# Patient Record
Sex: MALE | Race: WHITE | NOT HISPANIC OR LATINO | Employment: UNEMPLOYED | URBAN - METROPOLITAN AREA
[De-identification: names, ages, dates, MRNs, and addresses within clinical notes are randomized per-mention and may not be internally consistent; named-entity substitution may affect disease eponyms.]

---

## 2017-05-22 ENCOUNTER — THERAPY VISIT (OUTPATIENT)
Dept: PHYSICAL THERAPY | Facility: CLINIC | Age: 57
End: 2017-05-22
Payer: COMMERCIAL

## 2017-05-22 DIAGNOSIS — M25.519 SHOULDER PAIN: Primary | ICD-10-CM

## 2017-05-22 PROCEDURE — 97110 THERAPEUTIC EXERCISES: CPT | Mod: GP | Performed by: PHYSICAL THERAPIST

## 2017-05-22 PROCEDURE — 97161 PT EVAL LOW COMPLEX 20 MIN: CPT | Mod: GP | Performed by: PHYSICAL THERAPIST

## 2017-05-22 NOTE — PROGRESS NOTES
[facility] Initial Evaluation  Physical Therapy Initial Examination/Evaluation  May 22, 2017    Bubba MAYER Sarina is a 56 year old  male referred to physical therapy by Dr. Branch for treatment of right shoulder pain with Precautions/Restrictions/MD instructions eval and treat    Subjective:  Referring MD visit date: 4/17/17  DOI/onset: May 2016  Mechanism of injury: Insidious, however, believes that previous job may have contributed to his pain  DOS N/A  Previous treatment: None  Imaging: None  Chief Complaint:   Right shoulder pain that increases with various activities including reaching overhead/behind back, lifting, and carrying   Pain: rest 1 /10, activity 6/10 with reaching/lifting Described as: ache sometimes sharp Alleviated by: rest, inactivity Frequency: intermittent Progression of symptoms since initial onset: same Time of day when pain is worse: day  Sleeping: Will wake on occasion when sleeping on right shoulder    Occupation: None   Current HEP/exercise regimen: Progressive rotator cuff strengthening, periscapular strengthening  Patient's goals are Decrease right shoulder pain, return to prior level of function without pain    Pertinent PMH: History of fracture, overweight, smoking, concussions  General Health Reported by Patient: good  Return to MD:  6 weeks if no improvement      SHOULDER EVALUATION  Static Posture:  Forward head: Mild Rounded shoulders: Mild Scapular winging: None    Range of Motion:    AROM Flexion Abduction Flex/ER Ext/IR   Left 180 180 T2 T6   Right 180 180 T2 T9     Strength:  MMT Flex Scaption Abd ER @ 0 IR @ 0 Mid trap Lower trap   Left 5 5 5 5 5     Right 4 4 4 4 4+       Special Tests  Left Right   Empty Can - +   Drop arm  - -   Yergason's  - -   Speed's - +   Hester-Bj - +   Neer - -   Lift-off - -   Apprehension - -   Graves's - +   Sulcus Sign - -   AC cross body - -                                              Palpation:  Left:  unremarkable  Right: unremarkable           HPI                    Objective:    System    Physical Exam    General     ROS    Assessment/Plan:      Patient is a 56 year old male with right side shoulder complaints.    Patient has the following significant findings with corresponding treatment plan.                Diagnosis 1:  Right shoulder pain  Pain -  manual therapy, splint/taping/bracing/orthotics, self management, education and home program  Decreased ROM/flexibility - manual therapy, therapeutic exercise, therapeutic activity and home program  Decreased strength - therapeutic exercise, therapeutic activities and home program  Decreased proprioception - neuro re-education, therapeutic activities and home program    Therapy Evaluation Codes:   1) History comprised of:   Personal factors that impact the plan of care:      None.    Comorbidity factors that impact the plan of care are:      Concussion and Smoking.     Medications impacting care: Pain.  2) Examination of Body Systems comprised of:   Body structures and functions that impact the plan of care:      Shoulder.   Activity limitations that impact the plan of care are:      Lifting and reaching.  3) Clinical presentation characteristics are:   Stable/Uncomplicated.  4) Decision-Making    Low complexity using standardized patient assessment instrument and/or measureable assessment of functional outcome.  Cumulative Therapy Evaluation is: Low complexity.    Previous and current functional limitations:  (See Goal Flow Sheet for this information)    Short term and Long term goals: (See Goal Flow Sheet for this information)     Communication ability:  Patient appears to be able to clearly communicate and understand verbal and written communication and follow directions correctly.  Treatment Explanation - The following has been discussed with the patient:   RX ordered/plan of care  Anticipated outcomes  Possible risks and side effects  This patient would  benefit from PT intervention to resume normal activities.   Rehab potential is good.    Frequency:  1 X week, once daily  Duration:  for 6 weeks  Discharge Plan:  Achieve all LTG.  Independent in home treatment program.  Reach maximal therapeutic benefit.    Please refer to the daily flowsheet for treatment today, total treatment time and time spent performing 1:1 timed codes.

## 2017-05-22 NOTE — LETTER
[facility]  [address] [facility] [address]  [phone]    May 23, 2017    Re: Bubba Branch   :   1960  MRN:  [id number]   REFERRING PHYSICIAN:   Tawnya Carmona     [facility]    Date of Initial Evaluation:  2017  Visits:  Rxs Used: 1  Reason for Referral:  Shoulder pain    [facility] Initial Evaluation  Physical Therapy Initial Examination/Evaluation  May 22, 2017    Bubba Branch is a 56 year old  male referred to physical therapy by Dr. Branch for treatment of right shoulder pain with Precautions/Restrictions/MD instructions eval and treat    Subjective:  Referring MD visit date: 17  DOI/onset: May 2016  Mechanism of injury: Insidious, however, believes that previous job may have contributed to his pain  DOS N/A  Previous treatment: None  Imaging: None  Chief Complaint:   Right shoulder pain that increases with various activities including reaching overhead/behind back, lifting, and carrying   Pain: rest 1 /10, activity 6/10 with reaching/lifting Described as: ache sometimes sharp Alleviated by: rest, inactivity Frequency: intermittent Progression of symptoms since initial onset: same Time of day when pain is worse: day  Sleeping: Will wake on occasion when sleeping on right shoulder    Occupation: None   Current HEP/exercise regimen: Progressive rotator cuff strengthening, periscapular strengthening  Patient's goals are Decrease right shoulder pain, return to prior level of function without pain    Pertinent PMH: History of fracture, overweight, smoking, concussions  General Health Reported by Patient: good  Return to MD:  6 weeks if no improvement    Pertinent medical history includes:  History of fractures, overweight, smoking and other.  Medical allergies: no.  Other surgeries include:  Other.  Current medications:  None as reported by patient.  SHOULDER EVALUATION  Static  Posture:  Forward head: Mild Rounded shoulders: Mild Scapular winging: None  Range of Motion:    AROM Flexion Abduction Flex/ER Ext/IR   Left 180 180 T2 T6   Right 180 180 T2 T9     Strength:  MMT Flex Scaption Abd ER @ 0 IR @ 0 Mid trap Lower trap   Left 5 5 5 5 5     Right 4 4 4 4 4+       Special Tests  Left Right   Empty Can - +   Drop arm  - -   Yergason's  - -   Speed's - +   Hester-Bj - +   Neer - -   Lift-off - -   Apprehension - -   Graves's - +   Sulcus Sign - -   AC cross body - -                                            Palpation:  Left: unremarkable  Right: unremarkable    Assessment/Plan:      Patient is a 56 year old male with right side shoulder complaints.    Patient has the following significant findings with corresponding treatment plan.                Diagnosis 1:  Right shoulder pain  Pain -  manual therapy, splint/taping/bracing/orthotics, self management, education and home program  Decreased ROM/flexibility - manual therapy, therapeutic exercise, therapeutic activity and home program  Decreased strength - therapeutic exercise, therapeutic activities and home program  Decreased proprioception - neuro re-education, therapeutic activities and home program    Therapy Evaluation Codes:   1) History comprised of:   Personal factors that impact the plan of care:      None.    Comorbidity factors that impact the plan of care are:      Concussion and Smoking.     Medications impacting care: Pain.  2) Examination of Body Systems comprised of:   Body structures and functions that impact the plan of care:      Shoulder.   Activity limitations that impact the plan of care are:      Lifting and reaching.  3) Clinical presentation characteristics are:   Stable/Uncomplicated.  4) Decision-Making    Low complexity using standardized patient assessment instrument and/or measureable assessment of functional outcome.  Cumulative Therapy Evaluation is: Low complexity.  Previous and current functional  limitations:  (See Goal Flow Sheet for this information)    Short term and Long term goals: (See Goal Flow Sheet for this information)   Communication ability:  Patient appears to be able to clearly communicate and understand verbal and written communication and follow directions correctly.  Treatment Explanation - The following has been discussed with the patient:   RX ordered/plan of care  Anticipated outcomes  Possible risks and side effects  This patient would benefit from PT intervention to resume normal activities.   Rehab potential is good.  Frequency:  1 X week, once daily  Duration:  for 6 weeks  Discharge Plan:  Achieve all LTG.  Independent in home treatment program.  Reach maximal therapeutic benefit.    Thank you for your referral.    INQUIRIES  Therapist: Kedar Seymour, PT   [facility] [address] [address]  Phone: [phone]  Fax: [phone]

## 2017-05-22 NOTE — MR AVS SNAPSHOT
"              After Visit Summary   5/22/2017    Bubba Branch    MRN: [id number]           Patient Information     Date Of Birth          1960        Visit Information        Provider Department      5/22/2017 10:20 AM Kedar Seymour PT [facility]        Today's Diagnoses     Shoulder pain    -  1       Follow-ups after your visit        Your next 10 appointments already scheduled     Jun 07, 2017 11:00 AM CDT   TONYA Extremity with Kedar Seymour PT   [facility] ([facility])    [address] [place] [id number] [phone]            Jun 13, 2017 10:20 AM CDT   TONYA Extremity with Kedar Seymour PT   [facility] ([facility])    [address] [place] [id number] [phone]              Who to contact     If you have questions or need follow up information about today's clinic visit or your schedule please contact [facility] directly at [phone].  Normal or non-critical lab and imaging results will be communicated to you by [employer], letter or phone within 4 business days after the clinic has received the results. If you do not hear from us within 7 days, please contact the clinic through Imp[employer] or phone. If you have a critical or abnormal lab result, we will notify you by phone as soon as possible.  Submit refill requests through Toplist or call your pharmacy and they will forward the refill request to us. Please allow 3 business days for your refill to be completed.          Additional Information About Your Visit        [employer] Information     Toplist lets you send messages to your doctor, view your test results, renew your prescriptions, schedule appointments and more. To sign up, go to [email or web address] . Click on \"Log in\" on the left side of the screen, which will take you to the Welcome " "page. Then click on \"Sign up Now\" on the right side of the page.     You will be asked to enter the access code listed below, as well as some personal information. Please follow the directions to create your username and password.     Your access code is: [email or web address]  Expires: 2017 12:22 PM     Your access code will  in 90 days. If you need help or a new code, please call your [facility] [place] clinic or [phone].        Care EveryWhere ID     This is your Care EveryWhere ID. This could be used by other organizations to access your [place] medical records  [id number]         Blood Pressure from Last 3 Encounters:   10/12/16 127/77   16 93/54   12 112/78    Weight from Last 3 Encounters:   10/12/16 81.6 kg (180 lb)   16 73.5 kg (162 lb)   12 78.7 kg (173 lb 8 oz)              We Performed the Following     HC PT EVAL, LOW COMPLEXITY     TONYA INITIAL EVAL REPORT     THERAPEUTIC EXERCISES        Primary Care Provider    Km Delgado       [address] [facility] [address]        Thank you!     Thank you for [facility] [place]  for your care. Our goal is always to provide you with excellent care. Hearing back from our patients is one way we can continue to improve our services. Please take a few minutes to complete the written survey that you may receive in the mail after your visit with us. Thank you!             Your Updated Medication List - Protect others around you: Learn how to safely use, store and throw away your medicines at www.disposemymeds.org.          This list is accurate as of: 17 12:22 PM.  Always use your most recent med list.                   Brand Name Dispense Instructions for use    * acyclovir 200 MG capsule    ZOVIRAX    25 capsule    Take 1 capsule by mouth 5 times daily.       * acyclovir 400 MG tablet    ZOVIRAX    30 tablet    Take 1 tablet by mouth daily.       CALCIUM + D PO      Take  by mouth.       minocycline " 100 MG capsule    MINOCIN/DYNACIN    180 capsule    Take 1 capsule by mouth 2 times daily.       MULTIVITAMINS PO      Take 1 tablet by mouth daily.       omeprazole 40 MG capsule    priLOSEC    90 capsule    Take 1 capsule (40 mg) by mouth daily Take 30-60 minutes before a meal.       VITAMIN C PO      Take 1 tablet by mouth daily.       VITAMIN D PO      Take 1 tablet by mouth daily.       Vitamin-B Complex Tabs      Take 1 tablet by mouth daily.       * Notice:  This list has 2 medication(s) that are the same as other medications prescribed for you. Read the directions carefully, and ask your doctor or other care provider to review them with you.

## 2017-05-23 NOTE — PROGRESS NOTES
Subjective:    Patient is a 56 year old male presenting with rehab left ankle/foot hpi.                                      Pertinent medical history includes:  History of fractures, overweight, smoking and other.  Medical allergies: no.  Other surgeries include:  Other.  Current medications:  None as reported by patient.                                      Objective:    System    Physical Exam    General     ROS    Assessment/Plan:

## 2017-10-13 DIAGNOSIS — K21.00 GASTROESOPHAGEAL REFLUX DISEASE WITH ESOPHAGITIS: ICD-10-CM

## 2017-10-13 RX ORDER — OMEPRAZOLE 40 MG/1
CAPSULE, DELAYED RELEASE ORAL
Qty: 90 CAPSULE | Refills: 2 | Status: SHIPPED | OUTPATIENT
Start: 2017-10-13 | End: 2022-02-15

## 2017-12-20 ENCOUNTER — TRANSFERRED RECORDS (OUTPATIENT)
Dept: HEALTH INFORMATION MANAGEMENT | Facility: CLINIC | Age: 57
End: 2017-12-20

## 2017-12-20 ENCOUNTER — MEDICAL CORRESPONDENCE (OUTPATIENT)
Dept: HEALTH INFORMATION MANAGEMENT | Facility: CLINIC | Age: 57
End: 2017-12-20

## 2018-03-15 ENCOUNTER — OFFICE VISIT (OUTPATIENT)
Dept: DERMATOLOGY | Facility: CLINIC | Age: 58
End: 2018-03-15
Payer: COMMERCIAL

## 2018-03-15 DIAGNOSIS — L73.8 SENILE SEBACEOUS GLAND HYPERPLASIA: ICD-10-CM

## 2018-03-15 DIAGNOSIS — L70.0 ACNE VULGARIS: Primary | ICD-10-CM

## 2018-03-15 DIAGNOSIS — L90.5 ATROPHIC SCAR: ICD-10-CM

## 2018-03-15 RX ORDER — TRETINOIN 0.25 MG/G
CREAM TOPICAL
Qty: 45 G | Refills: 2 | Status: SHIPPED | OUTPATIENT
Start: 2018-03-15 | End: 2021-03-09

## 2018-03-15 ASSESSMENT — PAIN SCALES - GENERAL: PAINLEVEL: NO PAIN (0)

## 2018-03-15 NOTE — MR AVS SNAPSHOT
After Visit Summary   3/15/2018    Bubba Branch    MRN: [id number]           Patient Information     Date Of Birth          1960        Visit Information        Provider Department      3/15/2018 9:45 AM Km Barron MD [facility]        Today's Diagnoses     Acne vulgaris    -  1    Atrophic scar        Senile sebaceous gland hyperplasia           Follow-ups after your visit        Follow-up notes from your care team     Return in about 4 months (around 7/15/2018).      Who to contact     Please call your clinic at [phone] to:    Ask questions about your health    Make or cancel appointments    Discuss your medicines    Learn about your test results    Speak to your doctor            Additional Information About Your Visit        MyChart Information     [employer] is an electronic gateway that provides easy, online access to your medical records. With [employer], you can request a clinic appointment, read your test results, renew a prescription or communicate with your care team.     To sign up for [employer] visit the website at [email or web address]   You will be asked to enter the access code listed below, as well as some personal information. Please follow the directions to create your username and password.     Your access code is: [email or web address]  Expires: 2018  7:30 AM     Your access code will  in 90 days. If you need help or a new code, please contact your [facility] or call [phone] for assistance.        Care EveryWhere ID     This is your Care EveryWhere ID. This could be used by other organizations to access your [facility] medical records  [id number]         Blood Pressure from Last 3 Encounters:   No data found for BP    Weight from Last 3 Encounters:   No data found for Wt              Today, you had the following     No orders found for display         Today's Medication Changes          These changes are  accurate as of 3/15/18 11:59 PM.  If you have any questions, ask your nurse or doctor.               Start taking these medicines.        Dose/Directions    tretinoin 0.025 % cream   Commonly known as:  RETIN-A   Used for:  Acne vulgaris   Started by:  Km Braron MD        Apply a pea-sized amount evenly over the face at nighttime before bed.   Quantity:  45 g   Refills:  2            Where to get your medicines      These medications were sent to [facility] [address] [place] [address] AT [address] [address]     Phone:  [phone]     tretinoin 0.025 % cream                Primary Care Provider    Km Delgado       [address] [facility] [address]        Equal Access to Services     GEOVANNA BISHOP : Hadii onofre alex hadasho Soomaali, waaxda luqadaha, qaybta kaalmada adeegyada, waxay jose armando baron . So [facility] [phone].    ATENCIÓN: Si habla español, tiene a garcia disposición servicios gratuitos de asistencia lingüística. [facility] [phone].    We comply with applicable federal civil rights laws and [place] laws. We do not discriminate on the basis of race, color, national origin, age, disability, sex, sexual orientation, or gender identity.            Thank you!     Thank you for choosing [facility]  for your care. Our goal is always to provide you with excellent care. Hearing back from our patients is one way we can continue to improve our services. Please take a few minutes to complete the written survey that you may receive in the mail after your visit with us. Thank you!             Your Updated Medication List - Protect others around you: Learn how to safely use, store and throw away your medicines at www.disposemymeds.org.          This list is accurate as of 3/15/18 11:59 PM.  Always use your most recent med list.                   Brand Name Dispense Instructions for use Diagnosis    * acyclovir  200 MG capsule    ZOVIRAX    25 capsule    Take 1 capsule by mouth 5 times daily.    Recurrent herpes simplex       * acyclovir 400 MG tablet    ZOVIRAX    30 tablet    Take 1 tablet by mouth daily.    Herpes simplex       CALCIUM + D PO      Take  by mouth.        minocycline 100 MG capsule    MINOCIN/DYNACIN    180 capsule    Take 1 capsule by mouth 2 times daily.    Acne       MULTIVITAMINS PO      Take 1 tablet by mouth daily.        omeprazole 40 MG capsule    priLOSEC    90 capsule    TAKE 1 CAPSULE (40 MG) BY MOUTH DAILY TAKE 30-60 MINUTES BEFORE A MEAL.    Gastroesophageal reflux disease with esophagitis       tretinoin 0.025 % cream    RETIN-A    45 g    Apply a pea-sized amount evenly over the face at nighttime before bed.    Acne vulgaris       VITAMIN C PO      Take 1 tablet by mouth daily.        VITAMIN D PO      Take 1 tablet by mouth daily.        Vitamin-B Complex Tabs      Take 1 tablet by mouth daily.        * Notice:  This list has 2 medication(s) that are the same as other medications prescribed for you. Read the directions carefully, and ask your doctor or other care provider to review them with you.
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
## 2018-03-15 NOTE — NURSING NOTE
Chief Complaint   Patient presents with     Derm Problem     Mr. Adrian is here today for a consult for acne on the face, chest and shoulders. He has had acne since he was a teenager. He was on Tetracycline, Minocycline, and a few topicals years ago that he cannot remember the names of them. His primary was worried about the long term use of antibiotics and [place] [facility] He is using clindamycin lotion and BPO wash. He would talk about accutane.      Karely Gallo, CMA

## 2018-03-15 NOTE — LETTER
3/15/2018       RE: Bubba Branch  [address] [place] [address]     Dear Colleague,    Thank you for referring your patient, Bubba Branch, to the [facility] DERMATOLOGY at [facility]. Please see a copy of my visit note below.    [facility] Dermatology Note      Dermatology Problem List:  1. Acne vulgaris with sebaceous hyperplasia.   - tretinoin 0.025% cream QHS started 3/15/18  - BP 5% wash, clindamycin 1% lotion  2. Atrophic scar, R temporal scalp. Referral to [facility].     Encounter Date: Mar 15, 2018    CC:   Chief Complaint   Patient presents with     Derm Problem     Mr. Adrian is here today for a consult for acne on the face, chest and shoulders. He has had acne since he was a teenager. He was on Tetracycline, Minocycline, and a few topicals years ago that he cannot remember the names of them. His primary was worried about the long term use of antibiotics and he was seen at [facility]. He is using clindamycin lotion and BPO wash. He would talk about accutane.      History of Present Illness:  Mr. Bubba Branch is a 57 year old male who presents in self referral for acne vulgaris. He recently was evaluated at [facility] dermatology for a longstanding history of acne vulgaris on the face, chest, and back. Had prior been treated with long term oral minocycline which was discontinued at that visit. He was started on BP 5% wash with clindamycin 1% lotion. They had briefly discussed starting oral istretinoin if this was not effective. He states he has noticed some mild flaring of his acne particularly on his face, with deeper inflamed papules, since stopping minocycline. He gets occasionally spots on his back and chest though less bothersome to him. Health otherwise stable.    Of note, patient also reports an atrophic scar on his right scalp after a head injury. He is interested in cosmetic treatments for this. No other skin concerns.     Past  Medical History:   Patient Active Problem List   Diagnosis     Chemical dependency (H)     CARDIOVASCULAR SCREENING; LDL GOAL LESS THAN 130     Pes planus     Mild major depression (H)     Shoulder pain     Past Medical History:   Diagnosis Date     Back injury 2008     Chemical dependency (H)      Depression      Head injury 2003, 2008     Past Surgical History:   Procedure Laterality Date     ENT SURGERY       ESOPHAGOSCOPY, GASTROSCOPY, DUODENOSCOPY (EGD), COMBINED N/A 6/29/2016    Procedure: COMBINED ESOPHAGOSCOPY, GASTROSCOPY, DUODENOSCOPY (EGD), BIOPSY SINGLE OR MULTIPLE;  Surgeon: Kim Cisneros MD;  Location:  GI     Social History:  The patient does not work. The patient denies use of tanning beds.    Family History:  No relevant family medical history.     Medications:  Current Outpatient Prescriptions   Medication Sig Dispense Refill     omeprazole (PRILOSEC) 40 MG capsule TAKE 1 CAPSULE (40 MG) BY MOUTH DAILY TAKE 30-60 MINUTES BEFORE A MEAL. 90 capsule 2     acyclovir (ZOVIRAX) 400 MG tablet Take 1 tablet by mouth daily. 30 tablet 0     acyclovir (ZOVIRAX) 200 MG capsule Take 1 capsule by mouth 5 times daily. 25 capsule 5     Multiple Vitamin (MULTIVITAMINS PO) Take 1 tablet by mouth daily.       minocycline (MINOCIN,DYNACIN) 100 MG capsule Take 1 capsule by mouth 2 times daily. (Patient not taking: Reported on 3/15/2018) 180 capsule 3     Calcium Carbonate-Vitamin D (CALCIUM + D PO) Take  by mouth.       B Complex Vitamins (VITAMIN-B COMPLEX) TABS Take 1 tablet by mouth daily.       Ascorbic Acid (VITAMIN C PO) Take 1 tablet by mouth daily.       Cholecalciferol (VITAMIN D PO) Take 1 tablet by mouth daily.          Allergies   Allergen Reactions     Penicillins      Review of Systems:  -Skin/Heme New Pt: The patient denies frequent sun exposure. The patient denies excessive scarring or problems healing except as per HPI. The patient denies excessive bleeding.  -Constitutional: The patient denies  fatigue, fevers, chills, unintended weight loss, and night sweats.  -HEENT: Patient denies nonhealing oral sores.  -Skin: As above in HPI. No additional skin concerns.    Physical exam:  Vitals: There were no vitals taken for this visit.  GEN: This is a well developed, well-nourished male in no acute distress, in a pleasant mood.    SKIN: Acne exam, which includes the face, neck, upper central chest, and upper central back was performed.  - Few pink inflammatory papules on bilateral cheeks, chin, and nose.   - Waxy, yellow-colored papules c/w sebaceous hyperplasia on bilateral face  - Few pink follicular based papules on chest and back.   - Linear white scar on the right temporal scalp with atrophy of lateral edge.   -No other lesions of concern on areas examined.     Impression/Plan:  1. Acne vulgaris, mild inflammatory acne with associated sebaceous hyperplasia. Recommended starting topical retinoid and advised against use of systemic retinoids at this point given risks of treatment likely do not outweigh benefit given his mild disease. Otherwise, would continue prior regimen.     Start tretinoin 0.025% cream QHS; side effects discussed including dry skin, irritant dermatitis. Advised to start every other night and increase to nightly as tolerated. Can increase to 0.05% at follow-up as needed.     Continue clindamycin 1% lotion qAM    Continue BP 5% wash daily in the shower      2. Atrophic scar, R temporal scalp. Referral to [facility] to discuss possible laser treatment options. Also discussed that tretinoin 0.025% cream as above can lead to increased collagen production and may improve appearance of his scar. He can apply to that area nightly as well with application to his face.   Follow-up in 3-4 months, earlier for new or changing lesions.   Dr. Geiger staffed the patient.    Staff Involved:  Resident(Km Barron)/Staff(as above)    Again, thank you for allowing me to participate in the care of your  patient.    Sincerely,    Km Barron MD

## 2018-03-16 ASSESSMENT — PATIENT HEALTH QUESTIONNAIRE - PHQ9: SUM OF ALL RESPONSES TO PHQ QUESTIONS 1-9: 6

## 2018-03-19 ENCOUNTER — TELEPHONE (OUTPATIENT)
Dept: DERMATOLOGY | Facility: CLINIC | Age: 58
End: 2018-03-19

## 2018-03-19 NOTE — TELEPHONE ENCOUNTER
PRIOR AUTHORIZATION DENIED    Medication: Tretinoin - denied    Denial Date: 3/19/2018    Denial Rational: script is denied because we are out of network for [facility]      Appeal Information: no appeal allowed for out of network providers

## 2018-03-19 NOTE — TELEPHONE ENCOUNTER
Central Prior Authorization Team   Phone: [phone]    PA Initiation    Medication: Tretinoin  Insurance Company: [employer] - Phone [phone] Fax [phone]  Pharmacy Filling the Rx: [facility] [id number] [place] [address] AT [facility] [address]  Filling Pharmacy Phone: [phone]  Filling Pharmacy Fax: [phone]  Start Date: 3/19/2018

## 2018-04-11 NOTE — TELEPHONE ENCOUNTER
Bubba Simmons has no Rx benefits here at the [facility] or at the main hospital. All his Rx needs to filled at [facility] or an affiliate. His consult with Dr. Geiger & Sarah we'll try to get an exception on it via auth if the appointment ends up getting denied.

## 2019-06-11 ENCOUNTER — DOCUMENTATION ONLY (OUTPATIENT)
Dept: CARE COORDINATION | Facility: CLINIC | Age: 59
End: 2019-06-11

## 2019-07-03 ENCOUNTER — OFFICE VISIT (OUTPATIENT)
Dept: DERMATOLOGY | Facility: CLINIC | Age: 59
End: 2019-07-03
Payer: COMMERCIAL

## 2019-07-03 DIAGNOSIS — L90.5 SCAR: Primary | ICD-10-CM

## 2019-07-03 ASSESSMENT — PAIN SCALES - GENERAL: PAINLEVEL: NO PAIN (0)

## 2019-07-03 NOTE — NURSING NOTE
Dermatology Rooming Note    Bubba Branch's goals for this visit include:   Chief Complaint   Patient presents with     Derm Problem     Bubba would like to discuss treatment options for scarring and an indentation on his scalp.      Namrata Iraheta LPN

## 2019-07-03 NOTE — PATIENT INSTRUCTIONS
1. Scar and indentation   - Recommend using sunscreen daily on scalp and wearing a hat in order to prevent tanning   - Would use a CO2 (CORE) laser in order to help form new collagen and fill in the scars and other areas of concern   - Will get quote today for cost and, if interested, would have appt set up in 6 weeks    CO2RE Laser    I will experience redness, swelling, peeling, pain, and heat sensation. I may experience bleeding, oozing, itching, or acne. Risks are blistering, permanent scarring, temporary or permanent skin lightening or darkening, infection, and eye injury. I understand my outcome could be no improvement, slight improvement. Multiple treatments may be required.    TWO WEEKS BEFORE TREATMENT    Stop use of all Retinols   o Retin A, Tazorac,  anti-aging  products    Stop use of all glycolic acid treatments (longer if deeper peel)    Stop use of all salicylic acid products    Stop excessive sun exposure 8 weeks prior to treatment    Stop waxing    Stop abrasive scrubs    Stop microdermabrasion treatments    Stop hydroquinone(bleaching cream) 3 day sprior    Men should not shave 24 hours prior.      and bring  o Aquaphor   o Cetaphil cream  o Cetaphil gentle facial cleanser    THE DAY OF TREATMENT    Come to the appointment with no make-up, lotions or other products on the face    Take to your doctor about pain control after the procedure    If taking pain medication, please, bring a  or we will be unable to do the procedure      AFTER CARE INSTRUCTIONS    - Avoid the sun    - You may start wound care after 24 hours or sooner if comfortable.    - The first 3 days, start soaks, twice daily, soak for a few minutes with a clean cloth saturated with a dilute vinegar solution to help prevent infection.    Mix 2 tablespoons of household white vinegar in 2 cups of water.    Prepare fresh each day     Apply  Aquaphor to protect and soften the peeling skin.   - After 3 days, cleanse the face  water with water and a mild/gentle cleanser (i.e. Vanicream facial cleanser or Cetaphil facial cleanserproducts) once daily. Then, apply a thin layer of Cetaphil cream several times a day for healing and comfort.Once the skin sloughing and flaking is over, ointment (Aquaphor and epidermal repair cream) is no longer needed  - One sloughing and flaking is over sunblock  must be applied. Resume routine skin care and use of cosmetics as tolerated   - In some cases use of a bleaching cream may be recommended to start as well.  - Men may begin using an electric razor if they prefer to shave after 1 week.   - Apply sunblock every day and reapply every 2 hours when outside. Sun exposure can cause dark brown discoloration.    Reapply several times per day every day regardless of weather or indoors.    Use sunblock for face, SPF at least 30, broad spectrum (UVA & UVB)    Use diligently at least 3 months and avoid direct sun exposure.    Use a broad-rim hat if outdoors for a long time.      Who should I call with questions?       [facility]: [phone]       [facility]: [phone]       For urgent needs outside of business hours call the [facility] at [phone]        and ask for the dermatology resident on call
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
## 2019-07-31 ENCOUNTER — OFFICE VISIT (OUTPATIENT)
Dept: DERMATOLOGY | Facility: CLINIC | Age: 59
End: 2019-07-31
Payer: COMMERCIAL

## 2019-07-31 DIAGNOSIS — L90.5 SCAR: Primary | ICD-10-CM

## 2019-07-31 ASSESSMENT — PAIN SCALES - GENERAL: PAINLEVEL: NO PAIN (0)

## 2019-07-31 NOTE — NURSING NOTE
Chief Complaint   Patient presents with     Laser Treatment     Bubba is here today for CO2 laser on the right temporal scalp.      Merlene Bruce LPN    Dermatology Laser Intake Checklist:  History of psoriasis:No  History of recent tan, indoor or outdoor tanning/vacation or other sun exposure: No  History of vitiligo:No  Family history of vitiligo:No  Recent other cosmetic procedure(microderm abrasion/peel/hair removal/facial etc):No  History of HSV:NA   Did the patient start valtrex: nA  For genital laser hair removal patient only: Is there a history of genital warts or condyloma:NA  Tattoo in the area to be treated:No  Is patient using hydroquinone:No  Retinoids and other acne medications stopped for 2 weeks:No  Has the patient had accutane in the last 6-12 months:No  Pregnant or breastfeeding: NA  History of skin cancer in area planned for treatment: No  History of treatment with gold:No  Changes in medical history: No  Photos obtained: Yes  Does the patient smoke:No  Is the patient on ibuprofen/aspirin/plavix/coumadin/other blood thinner: No  If patient is taking narcotic or diazepam(valium)-does patient have : No  There were no vitals taken for this visit.

## 2019-07-31 NOTE — PATIENT INSTRUCTIONS
CO2RE Laser    I will experience redness, swelling, peeling, pain, and heat sensation. I may experience bleeding, oozing, itching, or acne. Risks are blistering, permanent scarring, temporary or permanent skin lightening or darkening, infection, and eye injury. I understand my outcome could be no improvement, slight improvement. Multiple treatments may be required.    TWO WEEKS BEFORE TREATMENT    Stop use of all Retinols   o Retin A, Tazorac,  anti-aging  products    Stop use of all glycolic acid treatments (longer if deeper peel)    Stop use of all salicylic acid products    Stop excessive sun exposure 8 weeks prior to treatment    Stop waxing    Stop abrasive scrubs    Stop microdermabrasion treatments    Stop hydroquinone(bleaching cream) 3 day sprior    Men should not shave 24 hours prior.      and bring  o Aquaphor   o Cetaphil cream  o Cetaphil gentle facial cleanser    THE DAY OF TREATMENT    Come to the appointment with no make-up, lotions or other products on the face    Take to your doctor about pain control after the procedure    If taking pain medication, please, bring a  or we will be unable to do the procedure      AFTER CARE INSTRUCTIONS    - Avoid the sun    - You may start wound care after 24 hours or sooner if comfortable.    - The first 3 days, start soaks, twice daily, soak for a few minutes with a clean cloth saturated with a dilute vinegar solution to help prevent infection.    Mix 2 tablespoons of household white vinegar in 2 cups of water.    Prepare fresh each day     Apply  Aquaphor to protect and soften the peeling skin.   - After 3 days, cleanse the face water with water and a mild/gentle cleanser (i.e. Vanicream facial cleanser or Cetaphil facial cleanserproducts) once daily. Then, apply a thin layer of Cetaphil cream several times a day for healing and comfort.Once the skin sloughing and flaking is over, ointment (Aquaphor and epidermal repair cream) is no longer  needed  - One sloughing and flaking is over sunblock  must be applied. Resume routine skin care and use of cosmetics as tolerated   - In some cases use of a bleaching cream may be recommended to start as well.  - Men may begin using an electric razor if they prefer to shave after 1 week.   - Apply sunblock every day and reapply every 2 hours when outside. Sun exposure can cause dark brown discoloration.    Reapply several times per day every day regardless of weather or indoors.    Use sunblock for face, SPF at least 30, broad spectrum (UVA & UVB)    Use diligently at least 3 months and avoid direct sun exposure.    Use a broad-rim hat if outdoors for a long time.      Who should I call with questions?       [facility]: [phone]       [facility]: [phone]       For urgent needs outside of business hours call the [facility] at [phone]        and ask for the dermatology resident on call          CO2RE Laser    I will experience redness, swelling, peeling, pain, and heat sensation. I may experience bleeding, oozing, itching, or acne. Risks are blistering, permanent scarring, temporary or permanent skin lightening or darkening, infection, and eye injury. I understand my outcome could be no improvement, slight improvement. Multiple treatments may be required.    TWO WEEKS BEFORE TREATMENT    Stop use of all Retinols   o Retin A, Tazorac,  anti-aging  products    Stop use of all glycolic acid treatments (longer if deeper peel)    Stop use of all salicylic acid products    Stop excessive sun exposure 8 weeks prior to treatment    Stop waxing    Stop abrasive scrubs    Stop microdermabrasion treatments    Stop hydroquinone(bleaching cream) 3 day sprior    Men should not shave 24 hours prior.      and bring  o Aquaphor   o Cetaphil cream  o Cetaphil gentle facial cleanser    THE DAY OF TREATMENT    Come to the appointment with no  make-up, lotions or other products on the face    Valtrex 500mg twice daily, start 2 days prior and continue taking for 1-2 weeks as per your dcotor    Bring your diazepam and percocet pills to your laser procedure    Start Keflex three times daily start after laser procedure    Take to your doctor about pain control after the procedure    If taking pain medication, please, bring a  or we will be unable to do the procedure      AFTER CARE INSTRUCTIONS  - Continue your Valtrex    - Start your Cephalexin(Keflex) right after your laser procedure    - Avoid the sun    - You may start wound care after 24 hours or sooner if comfortable.    - The first 3 days, start soaks, twice daily, soak for a few minutes with a clean cloth saturated with a dilute vinegar solution to help prevent infection.    Mix 2 tablespoons of household white vinegar in 2 cups of water.    Prepare fresh each day     Apply  Aquaphor to protect and soften the peeling skin.   - After 3 days, cleanse the face water with water and a mild/gentle cleanser (i.e. Vanicream facial cleanser or Cetaphil facial cleanserproducts) once daily. Then, apply a thin layer of Cetaphil cream several times a day for healing and comfort.Once the skin sloughing and flaking is over, ointment (Aquaphor and epidermal repair cream) is no longer needed  - One sloughing and flaking is over sunblock  must be applied. Resume routine skin care and use of cosmetics as tolerated   - In some cases use of a bleaching cream may be recommended to start as well.  - Men may begin using an electric razor if they prefer to shave after 1 week.   - Apply sunblock every day and reapply every 2 hours when outside. Sun exposure can cause dark brown discoloration.    Reapply several times per day every day regardless of weather or indoors.    Use sunblock for face, SPF at least 30, broad spectrum (UVA & UVB)    Use diligently at least 3 months and avoid direct sun exposure.    Use a broad-rim  hat if outdoors for a long time.      Who should I call with questions?       [facility]: [phone]       [facility]: [phone]       For urgent needs outside of business hours call the [facility] at [phone]        and ask for the dermatology resident on call

## 2019-07-31 NOTE — LETTER
7/31/2019       RE: Bubba Branch  [address] [facility] [address]     Dear Colleague,    Thank you for referring your patient, Bubba Branch, to the [facility] DERMATOLOGY at [facility]. Please see a copy of my visit note below.    See procedure note.     Again, thank you for allowing me to participate in the care of your patient.      Sincerely,    Reba Hudson MD

## 2019-07-31 NOTE — PROCEDURES
CORE Procedure: Cosmetic    Procedure Date: 07/31/2019  Staff: Dr. Reba Hudson MD  Assistant: Merlene Bruce LPN    Procedure:   CO2RE, fractional CO2 ablative laser treatment # 1    Anesthesia and Premedication:  Anesthesia (topical): Benzocaine 20%/Lidocaine 8%/Tetracaine 4% ointment  [employer]       Device Settings:  Diagnosis: traumatic scars   Location: right frontal scalp x4   Mode(class/deep/mid/etc): deep  Frational coverage(%): 5%  Core(mJ): 70   1 pass     2 passes on depressed scar on central scalp    Description of Procedure:   The nature and purpose of the procedure, associated risks, possible consequences, complications, and alternative methods of treatment were explained in detail including but not limited to redness, swelling, peeling of the skin, eye injury, infection, bleeding, oozing, heat sensation, itching or acne.  Possible outcomes were reviewed including the following: no improvement, slight improvement, skin lightening or darkening. The possibility of permanent scarring was reviewed. Discussion that multiple treatments may be required was completed.     Photo consent was obtained and reviewed, a time out was performed, and informed consent was obtained.  The area was cleansed with [employer]. Protective eyewear was worn by the patient and all personnel in the treatment room  The patient confirmed the site to be treated. The laser energy output was verified by meter reading. The [employer] cooler and smoke evacuator devices were operated coincident with the CO2RE laser. The areas were treated with CO2RE laser as described.  Cold compresses then aquaphor was applied in a thin layer. The patient tolerated the procedure well and no complications were noted. Verbal and written aftercare instructions were provided. Post procedure care including use of mild, gentle cleansers and moisturizers  for the first week following treatment was reviewed. The patient was recommend application of at least SPF 30  sunscreen daily  and avoidance of direct sunlight up to 3 months post procedure. The patient was discharged from the dermatology clinic in good condition.    The patient will follow up with nursing in 48 hours and with MD in 6 weeks.     The patient will pay the cosmetic fee today.     Staff Involved:  Scribe/Staff    Scribe Disclosure  I, Ninoska Bragg, am serving as a scribe to document services personally performed by Dr. Reba Hudson MD, based on data collection and the provider's statements to me.     Provider Disclosure:   The documentation recorded by the scribe accurately reflects the services I personally performed and the decisions made by me.    Reba Hudson MD    Department of Dermatology  [facility]: Phone: [phone], Fax:[phone]  [facility]: Phone: [phone], Fax: [phone]

## 2019-07-31 NOTE — LETTER
Date:August 1, 2019      Patient was self referred, no letter generated. Do not send.        [facility] Health Information

## 2019-08-02 ENCOUNTER — ALLIED HEALTH/NURSE VISIT (OUTPATIENT)
Dept: DERMATOLOGY | Facility: CLINIC | Age: 59
End: 2019-08-02
Payer: COMMERCIAL

## 2019-08-02 DIAGNOSIS — L90.5 SCAR: Primary | ICD-10-CM

## 2019-08-02 NOTE — PROGRESS NOTES
Bubba Branch comes into clinic today at the request of Dr Hudson Ordering Provider for follow up photos. Patient has been caring for site by applying aquaphor. He had stopped doing the vinegar soaks and was instructed to continue through today. Recommended he reapply aquaphor more often as there is a small amount of of scabbing at laser site. No s/s of infection present. He states understanding and will contact clinic with any concerns.    This service provided today was under the supervising provider of the day Dr Mckeon, who was available if needed.    Yary Johnson RN

## 2019-09-04 ENCOUNTER — OFFICE VISIT (OUTPATIENT)
Dept: DERMATOLOGY | Facility: CLINIC | Age: 59
End: 2019-09-04
Payer: COMMERCIAL

## 2019-09-04 DIAGNOSIS — L90.5 SCAR: Primary | ICD-10-CM

## 2019-09-04 ASSESSMENT — PAIN SCALES - GENERAL: PAINLEVEL: NO PAIN (0)

## 2019-09-04 NOTE — LETTER
9/4/2019       RE: Bubba Branch  [address] [place] [facility] [address]     Dear Colleague,    Thank you for referring your patient, Bubba Branch, to the [facility] DERMATOLOGY at [facility]. Please see a copy of my visit note below.    See procedure note.    Again, thank you for allowing me to participate in the care of your patient.      Sincerely,    Reba Hudson MD

## 2019-09-04 NOTE — NURSING NOTE
Drug Administration Record    Prior to injection, verified patient identity using patient's name and date of birth.  Due to injection administration, patient instructed to remain in clinic for 15 minutes  afterwards, and to report any adverse reaction to me immediately.    Drug Name: BLT   Route administered: topical

## 2019-09-04 NOTE — NURSING NOTE
"Chief Complaint   Patient presents with     Laser Treatment     Bubba is here today for CO2 for his scalp. Bubba notes \"great improvement after his last treatment\".      Merlene Bruce LPN      Dermatology Laser Intake Checklist:  History of psoriasis:No  History of recent tan, indoor or outdoor tanning/vacation or other sun exposure: No  History of vitiligo:No  Family history of vitiligo:No  Recent other cosmetic procedure(microderm abrasion/peel/hair removal/facial etc):No  History of HSV:No   Did the patient start valtrex: Yes, acyclovir  For genital laser hair removal patient only: Is there a history of genital warts or condyloma:Na  Tattoo in the area to be treated:No  Is patient using hydroquinone:No  Retinoids and other acne medications stopped for 2 weeks:Yes  Has the patient had accutane in the last 6-12 months:No  Pregnant or breastfeeding: Na'  History of skin cancer in area planned for treatment: No  History of treatment with gold:No  Changes in medical history: No  Photos obtained: Yes  Does the patient smoke:No  Is the patient on ibuprofen/aspirin/plavix/coumadin/other blood thinner: No  If patient is taking narcotic or diazepam(valium)-does patient have : Na  There were no vitals taken for this visit.      "

## 2019-09-04 NOTE — PATIENT INSTRUCTIONS
CO2RE Laser    I will experience redness, swelling, peeling, pain, and heat sensation. I may experience bleeding, oozing, itching, or acne. Risks are blistering, permanent scarring, temporary or permanent skin lightening or darkening, infection, and eye injury. I understand my outcome could be no improvement, slight improvement. Multiple treatments may be required.    TWO WEEKS BEFORE TREATMENT    Stop use of all Retinols   o Retin A, Tazorac,  anti-aging  products    Stop use of all glycolic acid treatments (longer if deeper peel)    Stop use of all salicylic acid products    Stop excessive sun exposure 8 weeks prior to treatment    Stop waxing    Stop abrasive scrubs    Stop microdermabrasion treatments    Stop hydroquinone(bleaching cream) 3 day sprior    Men should not shave 24 hours prior.      and bring  o Aquaphor   o Cetaphil cream  o Cetaphil gentle facial cleanser    THE DAY OF TREATMENT    Come to the appointment with no make-up, lotions or other products on the face    Take to your doctor about pain control after the procedure    If taking pain medication, please, bring a  or we will be unable to do the procedure      AFTER CARE INSTRUCTIONS    - Avoid the sun    - You may start wound care after 24 hours or sooner if comfortable.    - The first 3 days, start soaks, twice daily, soak for a few minutes with a clean cloth saturated with a dilute vinegar solution to help prevent infection.    Mix 2 tablespoons of household white vinegar in 2 cups of water.    Prepare fresh each day     Apply  Aquaphor to protect and soften the peeling skin.   - After 3 days, cleanse the face water with water and a mild/gentle cleanser (i.e. Vanicream facial cleanser or Cetaphil facial cleanserproducts) once daily. Then, apply a thin layer of Cetaphil cream several times a day for healing and comfort.Once the skin sloughing and flaking is over, ointment (Aquaphor and epidermal repair cream) is no longer  needed  - One sloughing and flaking is over sunblock  must be applied. Resume routine skin care and use of cosmetics as tolerated   - In some cases use of a bleaching cream may be recommended to start as well.  - Men may begin using an electric razor if they prefer to shave after 1 week.   - Apply sunblock every day and reapply every 2 hours when outside. Sun exposure can cause dark brown discoloration.    Reapply several times per day every day regardless of weather or indoors.    Use sunblock for face, SPF at least 30, broad spectrum (UVA & UVB)    Use diligently at least 3 months and avoid direct sun exposure.    Use a broad-rim hat if outdoors for a long time.      Who should I call with questions?       [facility]: [phone]       [facility]: [phone]       For urgent needs outside of business hours call the [facility] at [phone]        and ask for the dermatology resident on call

## 2019-09-04 NOTE — PROCEDURES
CORE Procedure: Cosmetic    Procedure Date: 09/04/2019  Staff: Dr. Reba Hudson MD  Resident/Fellow:  Amy Germain MD  Students: La    Procedure:   CO2RE, fractional CO2 ablative laser treatment #  2    Anesthesia and Premedication:  Anesthesia (topical): Benzocaine 20%/Lidocaine 8%/Tetracaine 4% ointment    Device Settings:  Diagnosis: traumatic scars  Location: right frontal scalp x5  Mode(class/deep/mid/etc): deep  Frational coverage(%): 5%  Core(mJ): 70    3 scars double passed      Fotofinder photos: No    Description of Procedure:   The nature and purpose of the procedure, associated risks, possible consequences, complications, and alternative methods of treatment were explained in detail including but not limited to redness, swelling, peeling of the skin, eye injury, infection, bleeding, oozing, heat sensation, itching or acne.  Possible outcomes were reviewed including the following: no improvement, slight improvement, skin lightening or darkening. The possibility of permanent scarring was reviewed. Discussion that multiple treatments may be required was completed.     Photo consent was obtained and reviewed, a time out was performed, and informed consent was obtained.  The area was cleansed with [employer]. Protective eyewear was worn by the patient and all personnel in the treatment room  The patient confirmed the site to be treated. The laser energy output was verified by meter reading. The [place] cooler and smoke evacuator devices were operated coincident with the CO2RE laser. The areas were treated with CO2RE laser as described.  Cold compresses then aquaphor was applied in a thin layer. The patient tolerated the procedure well and no complications were noted. Verbal and written aftercare instructions were provided. Post procedure care including use of mild, gentle cleansers and moisturizers  for the first week following treatment was reviewed. The patient was recommend application of at least SPF 30  sunscreen daily  and avoidance of direct sunlight up to 3 months post procedure. The patient was discharged from the dermatology clinic in good condition.    The patient will follow up declined at 48 hours, follow up in February.     The patient will pay the cosmetic fee today.    Staff Involved:  Medical Students/Scribe/Staff    Scribe Disclosure  I, Hoda Haider, am serving as a scribe to document services personally performed by Dr. Reba Hudson MD, based on data collection and the provider's statements to me.      Provider Disclosure:   The documentation recorded by the scribe accurately reflects the services I personally performed and the decisions made by me.    Reba Hudson MD    Department of Dermatology  [facility]: Phone: [phone], Fax:[phone]  [facility]: Phone: [phone], Fax: [phone]

## 2019-09-05 ENCOUNTER — OFFICE VISIT (OUTPATIENT)
Dept: DERMATOLOGY | Facility: CLINIC | Age: 59
End: 2019-09-05
Payer: COMMERCIAL

## 2019-09-05 DIAGNOSIS — L70.0 ACNE VULGARIS: Primary | ICD-10-CM

## 2019-09-05 DIAGNOSIS — L73.8 SENILE SEBACEOUS GLAND HYPERPLASIA: ICD-10-CM

## 2019-09-05 DIAGNOSIS — L65.9 NON-SCARRING ALOPECIA: ICD-10-CM

## 2019-09-05 RX ORDER — CLINDAMYCIN PHOSPHATE 10 UG/ML
LOTION TOPICAL 2 TIMES DAILY
Qty: 60 ML | Refills: 11 | Status: SHIPPED | OUTPATIENT
Start: 2019-09-05 | End: 2021-03-09

## 2019-09-05 RX ORDER — TRETINOIN 0.25 MG/G
CREAM TOPICAL
Qty: 45 G | Refills: 11 | Status: SHIPPED | OUTPATIENT
Start: 2019-09-05 | End: 2021-03-09

## 2019-09-05 ASSESSMENT — PAIN SCALES - GENERAL: PAINLEVEL: NO PAIN (0)

## 2019-09-05 NOTE — NURSING NOTE
Chief Complaint   Patient presents with     Derm Problem     Bubba is here today for an Acne consult. Pt has been on minocycline since he was a teenager-woudl like to discuss alternate treatments.      Merlene Bruce LPN

## 2019-09-05 NOTE — PATIENT INSTRUCTIONS
We would like to try an aggressive topical regimen at this time:    For your face:    In the morning:   -please wash with benzoyl peroxide wash, this is safe on the face and on the body. You can use this when you shower in the morning. You can follow this with an antibiotic lotion called clindamycin lotion. I can send both of these through your insurance.     In the evening, please either wash with a gentle cleanser such as cetaphil or vanicream gentle cleanser (aveeno makes one as well) and about a half hour later please apply tretinoin 0.025% cream in a pea sized amount the face. We have room to increase the potency of this if it is not too drying. Please use a noncomedognic moisturizer (I suggest vanicream lite for the face or cerave light for the face).     IF tretinoin 0.025 % is not covered by your insurance, there is an over the counter version called adapalene gel that can be used in it's place (same vitamin A type derivative), please ask your pharmacist if you need help finding this.     Benzoyl peroxide washes can also be purchased over the counter if not covered by insurance.       [address] [id number] [phone]    Gentle Skin Care    Below is a list of products our providers recommend for gentle skin care.  Moisturizers:    Lighter; Exederm Intensive Moisture Cream, Cetaphil Cream, CeraVe, Aveeno Positively radiant and Vanicream Light     Thicker; Aquaphor Ointment, Vaseline, Petroleum Jelly, Eucerin Original Healing Cream and Vanicream, CeraVe Healing Ointment, Aquaphor Body Spray    Avoid Lotions (too thin)  Mild Cleansers:    Dove- Fragrance Free bar or wash    CeraVe     Vanicream Cleansing bar    Cetaphil Cleanser     Aquaphor 2 in1 Gentle Wash and Shampoo    Dove Baby wash    Exederm Body wash       Laundry Products:      All Free and Clear    Cheer Free    Generic Brands are okay as long as they are  Fragrance Free      Avoid fabric  softeners  and dryer sheets   Sunscreens: SPF 30 or greater       Sunscreens that contain Zinc Oxide and/or Titanium Dioxide should be applied, these are physical blockers. One or both of these should be listed in the  Active Ingredients     Any other listed ingredients under the active ingredients would be a chemically based sunscreen which might be irritating.    Spray sunscreens should be avoided because these are typically chemical sunscreens.      Shampoo and Conditioners:    Free and Clear by Vanicream    Aquaphor 2 in 1 Gentle Wash and Shampoo   Oils:    Mineral Oil     Emu Oil     For some patients: Coconut (raw, unrefined, organic) and Sunflower seed oil              Generic Products are an okay substitute, but make sure they are fragrance free.  *Reading the product ingredients list is very important  *Avoid product that have fragrance added to them.   *Organic does not mean  fragrance free.  In fact patients with sensitive skin can become quite irritated by some organic products.     1. Daily bathing is recommended. Make sure you are applying a good moisturizer after bathing every time.  2. Use Moisturizing creams at least twice daily to the whole body. Your provider may recommend a lighter or heavier moisturizer based on your child s severity and that time of year it is.  3. Creams are more moisturizing than lotions.       Care Plan:  1. Keep bathing and showering short, less than 15 minutes   2. Always use lukewarm warm when possible. AVOID HOT or COLD water  3. DO NOT use bubble bath  4. Limit the use of soaps. Focus on the skin folds, face, armpits, groin and feet towards the end of the bath  5. Do NOT vigorously scrub when you cleanse the skin  6. After bathing, PAT your skin lightly with a towel. DO NOT rub or scrub when drying  7. ALWAYS apply a moisturizer immediately after bathing. This helps to  lock in  the moisture. * IF YOU WERE PRESCRIBED A TOPICAL MEDICATION, APPLY YOUR MEDICATION FIRST  THEN COVER WITH YOUR DAILY MOISTURIZER  8. Reapply moisturizing agents at least twice daily to your whole body    Other helpful tips:    Do not use products such as powders, perfumes, or colognes on your skin    Diffusers can be harsh on sensitive skin, use with caution if you or your child has sensitive skin     Avoid saunas and steam baths. This temperature is too HOT    Avoid tight or  scratchy  clothing such as wool    Always wash new clothing before wearing them for the first time    Sometimes a humidifier or vaporizer can be used at night can help the dry skin. Remember to keep these items clean to avoid mold growth.

## 2019-09-05 NOTE — PROGRESS NOTES
"[facility] Dermatology Note      Dermatology Problem List:  1. Acne vulgaris and sebaceous hyperplasia  - tretinoin 0.025% cream QHS (adapalene if not covered by insurance)  - BP 5% wash, clindamycin 1% lotion in the am   2. Atrophic scars, R temporal scalp. S/p PDL per Dr. Hudson     Encounter Date: Sep 5, 2019    CC:   Chief Complaint   Patient presents with     Derm Problem     Bubba is here today for an Acne consult. Pt has been on minocycline since he was a teenager-woudl like to discuss alternate treatments.        History of Present Illness:  Mr. Bubba Branch is a 58 year old male who presents as a follow-up for acne vulgaris. The patient was last seen yesterday when he received PDL therapy for scars on the temporal/parietal scalp. He has also been seen by our clinic in the past for acne vulgaris and we have recommended BPO wash, clindamycin lotion, and topical retinoids, although he does not recall this. He states he has had acne since he was a teenager. He states he was initially at that time put on oral minocycline and has basically been taking this \"every since\" and this has been refilled by his primary care doctors. He states that right now, he gets breakouts on his back and his nose with inflamed bumps and will take up to 4-5 tablets of minocycline at a time for 1-2 days during flares (which occur about twice a month). He has not been using any topical over the counter products or prescriptions for the face. He is wondering If accutane would be a good option for him, as he has been told this can cure his acne. He reports that since he has a scar along his right eyebrow, he shaves both eyebrows and the scalp. He has not been losing hair elsewhere on his body (other than some \"male pattern hair loss\").    Past Medical History:   Patient Active Problem List   Diagnosis     Chemical dependency (H)     CARDIOVASCULAR SCREENING; LDL GOAL LESS THAN 130     Pes planus     Mild major " depression (H)     Shoulder pain     Past Medical History:   Diagnosis Date     Back injury 2008     Chemical dependency (H)      Depression      Head injury 2003, 2008     Past Surgical History:   Procedure Laterality Date     ENT SURGERY       ESOPHAGOSCOPY, GASTROSCOPY, DUODENOSCOPY (EGD), COMBINED N/A 6/29/2016    Procedure: COMBINED ESOPHAGOSCOPY, GASTROSCOPY, DUODENOSCOPY (EGD), BIOPSY SINGLE OR MULTIPLE;  Surgeon: Kim Cisneros MD;  Location:  GI       Social History:  Patient reports that he has quit smoking. He quit after 10.00 years of use. He has never used smokeless tobacco. He reports that he drinks alcohol. He reports that he does not use drugs.    Family History:  Family History   Problem Relation Age of Onset     Diabetes Mother      Heart Disease Mother      Hypertension Father        Medications:  Current Outpatient Medications   Medication Sig Dispense Refill     acyclovir (ZOVIRAX) 200 MG capsule Take 1 capsule by mouth 5 times daily. 25 capsule 5     acyclovir (ZOVIRAX) 400 MG tablet Take 1 tablet by mouth daily. 30 tablet 0     Ascorbic Acid (VITAMIN C PO) Take 1 tablet by mouth daily.       B Complex Vitamins (VITAMIN-B COMPLEX) TABS Take 1 tablet by mouth daily.       Calcium Carbonate-Vitamin D (CALCIUM + D PO) Take  by mouth.       Cholecalciferol (VITAMIN D PO) Take 1 tablet by mouth daily.       minocycline (MINOCIN,DYNACIN) 100 MG capsule Take 1 capsule by mouth 2 times daily. 180 capsule 3     Multiple Vitamin (MULTIVITAMINS PO) Take 1 tablet by mouth daily.       omeprazole (PRILOSEC) 40 MG capsule TAKE 1 CAPSULE (40 MG) BY MOUTH DAILY TAKE 30-60 MINUTES BEFORE A MEAL. 90 capsule 2     tretinoin (RETIN-A) 0.025 % cream Apply a pea-sized amount evenly over the face at nighttime before bed. (Patient not taking: Reported on 7/3/2019) 45 g 2        Allergies   Allergen Reactions     Penicillins      Review of Systems:  -As per HPI  -Constitutional: Otherwise feeling well today, in usual  state of health.  -Skin: As above in HPI. No additional skin concerns.    Physical exam:  Vitals: There were no vitals taken for this visit.  GEN: This is a well developed, well-nourished male in no acute distress, in a pleasant mood.    SKIN: Acne exam, which includes the face, neck, upper central chest, and upper central back was performed.  -Swanson skin type: II  -There are yellow oily papules with central umbilication located on the central forehead and upper malar cheeks.  -several linear up to 4 cm angulated scars on the temporal parietal scalp which are erythematous in the setting of recent PDL procedure. No e/o infection on exam.   -two small ovoid scars along medial eyebrow with faint blue discoloration.  -No other lesions of concern on areas examined.   -loss of hair on the eyebrows an eyelashes    Impression/Plan:  1. Acne vulgaris and sebaceous hyperplasia    Discussed etiology, pathogenesis, and treatment strategies for this common skin condition. At this point, his acne vulgaris is very mild with no significantly active inflammatory papules on exam today and few open/closed comedones. At this point, an aggressive topical regimen would be preferred to systemic antibiotics and accutane and this was discussed with the patient in depth.     BPO wash followed by clindamycin lotion in the am dailyl    Tretinoin 0.025% cream nightly, adapalene if not covered by insurance     If flares, he will send us a photo and we can consider a short course of oral antibiotics. Recommended cessation of minocycline.     2. Hair loss: appears consistent with alopecia totalis, though patient reports shaving the involved areas.     Follow-up in 3 months, earlier for new or changing lesions.       Dr. Way staffed the patient.    Staff Involved:  Resident(Amy Germain)/Staff    Staff Physician Comments:   I saw and evaluated the patient with the resident and I edited the assessment and plan as documented in the note.  I was present for the examination.    Stalin Way MD   of Dermatology  Department of Dermatology  [facility]

## 2019-09-05 NOTE — LETTER
"9/5/2019       RE: Bubba Branch  [address] [place] [address]     Dear Colleague,    Thank you for referring your patient, Bubba Branch, to the [facility] DERMATOLOGY at [facility]. Please see a copy of my visit note below.    [facility] Dermatology Note      Dermatology Problem List:  1. Acne vulgaris and sebaceous hyperplasia  - tretinoin 0.025% cream QHS (adapalene if not covered by insurance)  - BP 5% wash, clindamycin 1% lotion in the am   2. Atrophic scars, R temporal scalp. S/p PDL per Dr. Hudson     Encounter Date: Sep 5, 2019    CC:   Chief Complaint   Patient presents with     Derm Problem     Bubba is here today for an Acne consult. Pt has been on minocycline since he was a teenager-woudl like to discuss alternate treatments.        History of Present Illness:  Mr. Bubba Branch is a 58 year old male who presents as a follow-up for acne vulgaris. The patient was last seen yesterday when he received PDL therapy for scars on the temporal/parietal scalp. He has also been seen by our clinic in the past for acne vulgaris and we have recommended BPO wash, clindamycin lotion, and topical retinoids, although he does not recall this. He states he has had acne since he was a teenager. He states he was initially at that time put on oral minocycline and has basically been taking this \"every since\" and this has been refilled by his primary care doctors. He states that right now, he gets breakouts on his back and his nose with inflamed bumps and will take up to 4-5 tablets of minocycline at a time for 1-2 days during flares (which occur about twice a month). He has not been using any topical over the counter products or prescriptions for the face. He is wondering If accutane would be a good option for him, as he has been told this can cure his acne. He reports that since he has a scar along his right eyebrow, he shaves both eyebrows and " "the scalp. He has not been losing hair elsewhere on his body (other than some \"male pattern hair loss\").    Past Medical History:   Patient Active Problem List   Diagnosis     Chemical dependency (H)     CARDIOVASCULAR SCREENING; LDL GOAL LESS THAN 130     Pes planus     Mild major depression (H)     Shoulder pain     Past Medical History:   Diagnosis Date     Back injury 2008     Chemical dependency (H)      Depression      Head injury 2003, 2008     Past Surgical History:   Procedure Laterality Date     ENT SURGERY       ESOPHAGOSCOPY, GASTROSCOPY, DUODENOSCOPY (EGD), COMBINED N/A 6/29/2016    Procedure: COMBINED ESOPHAGOSCOPY, GASTROSCOPY, DUODENOSCOPY (EGD), BIOPSY SINGLE OR MULTIPLE;  Surgeon: Kim Cisneros MD;  Location: [facility]       Social History:  Patient reports that he has quit smoking. He quit after 10.00 years of use. He has never used smokeless tobacco. He reports that he drinks alcohol. He reports that he does not use drugs.    Family History:  Family History   Problem Relation Age of Onset     Diabetes Mother      Heart Disease Mother      Hypertension Father        Medications:  Current Outpatient Medications   Medication Sig Dispense Refill     acyclovir (ZOVIRAX) 200 MG capsule Take 1 capsule by mouth 5 times daily. 25 capsule 5     acyclovir (ZOVIRAX) 400 MG tablet Take 1 tablet by mouth daily. 30 tablet 0     Ascorbic Acid (VITAMIN C PO) Take 1 tablet by mouth daily.       B Complex Vitamins (VITAMIN-B COMPLEX) TABS Take 1 tablet by mouth daily.       Calcium Carbonate-Vitamin D (CALCIUM + D PO) Take  by mouth.       Cholecalciferol (VITAMIN D PO) Take 1 tablet by mouth daily.       minocycline (MINOCIN,DYNACIN) 100 MG capsule Take 1 capsule by mouth 2 times daily. 180 capsule 3     Multiple Vitamin (MULTIVITAMINS PO) Take 1 tablet by mouth daily.       omeprazole (PRILOSEC) 40 MG capsule TAKE 1 CAPSULE (40 MG) BY MOUTH DAILY TAKE 30-60 MINUTES BEFORE A MEAL. 90 capsule 2     tretinoin (RETIN-A) " 0.025 % cream Apply a pea-sized amount evenly over the face at nighttime before bed. (Patient not taking: Reported on 7/3/2019) 45 g 2        Allergies   Allergen Reactions     Penicillins      Review of Systems:  -As per HPI  -Constitutional: Otherwise feeling well today, in usual state of health.  -Skin: As above in HPI. No additional skin concerns.    Physical exam:  Vitals: There were no vitals taken for this visit.  GEN: This is a well developed, well-nourished male in no acute distress, in a pleasant mood.    SKIN: Acne exam, which includes the face, neck, upper central chest, and upper central back was performed.  -Swanson skin type: II  -There are yellow oily papules with central umbilication located on the central forehead and upper malar cheeks.  -several linear up to 4 cm angulated scars on the temporal parietal scalp which are erythematous in the setting of recent PDL procedure. No e/o infection on exam.   -two small ovoid scars along medial eyebrow with faint blue discoloration.  -No other lesions of concern on areas examined.   -loss of hair on the eyebrows an eyelashes    Impression/Plan:  1. Acne vulgaris and sebaceous hyperplasia    Discussed etiology, pathogenesis, and treatment strategies for this common skin condition. At this point, his acne vulgaris is very mild with no significantly active inflammatory papules on exam today and few open/closed comedones. At this point, an aggressive topical regimen would be preferred to systemic antibiotics and accutane and this was discussed with the patient in depth.     BPO wash followed by clindamycin lotion in the am dailyl    Tretinoin 0.025% cream nightly, adapalene if not covered by insurance     If flares, he will send us a photo and we can consider a short course of oral antibiotics. Recommended cessation of minocycline.     2. Hair loss: appears consistent with alopecia totalis, though patient reports shaving the involved areas.     Follow-up in  3 months, earlier for new or changing lesions.       Dr. Way staffed the patient.    Staff Involved:  Resident(Amy Germain)/Staff    Staff Physician Comments:   I saw and evaluated the patient with the resident and I edited the assessment and plan as documented in the note. I was present for the examination.    Stalin Way MD   of Dermatology  Department of Dermatology  [facility]    Again, thank you for allowing me to participate in the care of your patient.      Sincerely,    Amy Germain MD

## 2019-09-30 ENCOUNTER — HEALTH MAINTENANCE LETTER (OUTPATIENT)
Age: 59
End: 2019-09-30

## 2019-11-07 ENCOUNTER — OFFICE VISIT (OUTPATIENT)
Dept: DERMATOLOGY | Facility: CLINIC | Age: 59
End: 2019-11-07
Payer: COMMERCIAL

## 2019-11-07 DIAGNOSIS — L90.5 PAINFUL CUTANEOUS SCAR: Primary | ICD-10-CM

## 2019-11-07 DIAGNOSIS — D17.22 LIPOMA OF LEFT UPPER EXTREMITY: ICD-10-CM

## 2019-11-07 DIAGNOSIS — L70.0 ACNE VULGARIS: ICD-10-CM

## 2019-11-07 DIAGNOSIS — R52 PAINFUL CUTANEOUS SCAR: Primary | ICD-10-CM

## 2019-11-07 ASSESSMENT — PAIN SCALES - GENERAL: PAINLEVEL: NO PAIN (0)

## 2019-11-07 NOTE — NURSING NOTE
Chief Complaint   Patient presents with     Derm Problem     Bubba is here today for Acne folloe up. Patient notes improvement. Bubba is concerned with a possible lipoma on his forearm.     Merlene Bruce LPN

## 2019-11-07 NOTE — PROGRESS NOTES
[facility] Dermatology Note      Dermatology Problem List:  1. Acne vulgaris and sebaceous hyperplasia  - tretinoin 0.025% cream QHS (adapalene if not covered by insurance)  - BP 5% wash, clindamycin 1% lotion in the am   2. Atrophic scars, R temporal scalp. S/p laser therapy per Dr. Hudson   3. Lipoma, left medial forearm, pending excision in resident clinic     Encounter Date: Nov 7, 2019    CC:   Chief Complaint   Patient presents with     Derm Problem     Bubba is here today for Acne folloe up. Patient notes improvement. Bubba is concerned with a possible lipoma on his forearm.     History of Present Illness:  Mr. Bubba Branch is a 58 year old male who presents as a follow-up for acne vulgaris. The patient was last seen 9/5/19 when we recommended BPO wash, clindamycin lotion, and tretinoin 0.025% cream. He notes good improvement with this and would like refills. He also notes a subcutaneous nodule on the left arm that he would like removed. Occasionally this is painful and irritating. He states this has been biopsied and came back as a lipoma several years go. He also wonders if he can repeat laser therapy to the scars on his forehead/scalp which he has had done in the past with Dr. Hudson. The patient is well today in their baseline state of health, with no additional skin concerns. The patient has not noticed any additional new, growing, changing, itching, bleeding areas on the skin.     Past Medical History:   Patient Active Problem List   Diagnosis     Chemical dependency (H)     CARDIOVASCULAR SCREENING; LDL GOAL LESS THAN 130     Pes planus     Mild major depression (H)     Shoulder pain     Past Medical History:   Diagnosis Date     Back injury 2008     Chemical dependency (H)      Depression      Head injury 2003, 2008     Past Surgical History:   Procedure Laterality Date     ENT SURGERY       ESOPHAGOSCOPY, GASTROSCOPY, DUODENOSCOPY (EGD), COMBINED N/A 6/29/2016    Procedure:  COMBINED ESOPHAGOSCOPY, GASTROSCOPY, DUODENOSCOPY (EGD), BIOPSY SINGLE OR MULTIPLE;  Surgeon: Kim Cisneros MD;  Location:  GI       Social History:  Patient reports that he has quit smoking. He quit after 10.00 years of use. He has never used smokeless tobacco. He reports current alcohol use. He reports that he does not use drugs.    Family History:  Family History   Problem Relation Age of Onset     Diabetes Mother      Heart Disease Mother      Hypertension Father        Medications:  Current Outpatient Medications   Medication Sig Dispense Refill     acyclovir (ZOVIRAX) 200 MG capsule Take 1 capsule by mouth 5 times daily. 25 capsule 5     acyclovir (ZOVIRAX) 400 MG tablet Take 1 tablet by mouth daily. 30 tablet 0     benzoyl peroxide 5 % external liquid Use daily as directed 226 g 11     clindamycin (CLEOCIN T) 1 % external lotion Apply topically 2 times daily 60 mL 11     minocycline (MINOCIN,DYNACIN) 100 MG capsule Take 1 capsule by mouth 2 times daily. 180 capsule 3     Multiple Vitamin (MULTIVITAMINS PO) Take 1 tablet by mouth daily.       omeprazole (PRILOSEC) 40 MG capsule TAKE 1 CAPSULE (40 MG) BY MOUTH DAILY TAKE 30-60 MINUTES BEFORE A MEAL. 90 capsule 2     tretinoin (RETIN-A) 0.025 % cream Apply a pea-sized amount evenly over the face at nighttime before bed. 45 g 2     tretinoin (RETIN-A) 0.025 % external cream Use every night to face, pea sized amount 45 g 11     Ascorbic Acid (VITAMIN C PO) Take 1 tablet by mouth daily.       B Complex Vitamins (VITAMIN-B COMPLEX) TABS Take 1 tablet by mouth daily.       Calcium Carbonate-Vitamin D (CALCIUM + D PO) Take  by mouth.       Cholecalciferol (VITAMIN D PO) Take 1 tablet by mouth daily.          Allergies   Allergen Reactions     Penicillins          Review of Systems:  -As per HPI  -Constitutional: Otherwise feeling well today, in usual state of health.  -Skin: As above in HPI. No additional skin concerns.    Physical exam:  Vitals: There were no  vitals taken for this visit.  GEN: This is a well developed, well-nourished male in no acute distress, in a pleasant mood.    SKIN: Acne exam, which includes the face, neck, upper central chest, and upper central back was performed. Additionally examined left arm.   -There are superifical acneiform papules with intermixed open and closed comedones on the nose and cheeks.   -several linear up to 4 cm angulated scars on the temporal parietal scalp.  -two small ovoid scars along medial eyebrow with faint blue discoloration.  -No other lesions of concern on areas examined.     Impression/Plan:  1. Acne vulgaris    Continue current regimen     BPO wash followed by clindamycin lotion in the am daily    Tretinoin 0.025% cream nightly, adapalene if not covered by insurance     2. Lipoma, left medial forearm    Discussed etiology, pathogenesis, and treatment strategies for this common skin condition.     Pending excision in resident clinic by Dr. Roland Thayer     3. Traumatic scars s/p fractional CO2 ablative laser by Dr. Hudson (most recent 9/4/19), would like repeat treatment    Message sent to Gutierrez for scheduling     CC Referred Self, MD  No address on file on close of this encounter.  Follow-up in 2 months for excision earlier for new or changing lesions. Can be seen yearly for refills for acne vulgaris or can follow with PCP.      staffed the patient.    Staff Involved:  Resident(Amy Germain)/Staff    I have personally examined this patient and agree with Dr. Germain's documentation and plan of care. I have reviewed and amended the resident's note above. The documentation accurately reflects my clinical observations, diagnoses, treatment and follow-up plans.     Genoveva Wilcox MD  Dermatology Staff

## 2019-11-07 NOTE — PROGRESS NOTES
[facility] Dermatology Note      Dermatology Problem List:  1.***    Encounter Date: Nov 7, 2019    CC:   Chief Complaint   Patient presents with     Derm Problem     Bubba is here today for Acne folloe up. Patient notes improvement. Bubba is concerned with a possible lipoma on his forearm.     History of Present Illness:  Mr. Bubba Branch is a 58 year old male who {hpi:402314}    Past Medical History:   Patient Active Problem List   Diagnosis     Chemical dependency (H)     CARDIOVASCULAR SCREENING; LDL GOAL LESS THAN 130     Pes planus     Mild major depression (H)     Shoulder pain     Past Medical History:   Diagnosis Date     Back injury 2008     Chemical dependency (H)      Depression      Head injury 2003, 2008     Past Surgical History:   Procedure Laterality Date     ENT SURGERY       ESOPHAGOSCOPY, GASTROSCOPY, DUODENOSCOPY (EGD), COMBINED N/A 6/29/2016    Procedure: COMBINED ESOPHAGOSCOPY, GASTROSCOPY, DUODENOSCOPY (EGD), BIOPSY SINGLE OR MULTIPLE;  Surgeon: Kim Cisneros MD;  Location: [facility]       Social History:  Patient reports that he has quit smoking. He quit after 10.00 years of use. He has never used smokeless tobacco. He reports current alcohol use. He reports that he does not use drugs.    Family History:  Family History   Problem Relation Age of Onset     Diabetes Mother      Heart Disease Mother      Hypertension Father        Medications:  Current Outpatient Medications   Medication Sig Dispense Refill     acyclovir (ZOVIRAX) 200 MG capsule Take 1 capsule by mouth 5 times daily. 25 capsule 5     acyclovir (ZOVIRAX) 400 MG tablet Take 1 tablet by mouth daily. 30 tablet 0     benzoyl peroxide 5 % external liquid Use daily as directed 226 g 11     clindamycin (CLEOCIN T) 1 % external lotion Apply topically 2 times daily 60 mL 11     minocycline (MINOCIN,DYNACIN) 100 MG capsule Take 1 capsule by mouth 2 times daily. 180 capsule 3     Multiple Vitamin (MULTIVITAMINS  "PO) Take 1 tablet by mouth daily.       omeprazole (PRILOSEC) 40 MG capsule TAKE 1 CAPSULE (40 MG) BY MOUTH DAILY TAKE 30-60 MINUTES BEFORE A MEAL. 90 capsule 2     tretinoin (RETIN-A) 0.025 % cream Apply a pea-sized amount evenly over the face at nighttime before bed. 45 g 2     tretinoin (RETIN-A) 0.025 % external cream Use every night to face, pea sized amount 45 g 11     Ascorbic Acid (VITAMIN C PO) Take 1 tablet by mouth daily.       B Complex Vitamins (VITAMIN-B COMPLEX) TABS Take 1 tablet by mouth daily.       Calcium Carbonate-Vitamin D (CALCIUM + D PO) Take  by mouth.       Cholecalciferol (VITAMIN D PO) Take 1 tablet by mouth daily.          Allergies   Allergen Reactions     Penicillins          Review of Systems:  -{ROS:330496}  -Constitutional: Otherwise feeling well today, in usual state of health.  -HEENT: Patient denies nonhealing oral sores.  -Skin: As above in HPI. No additional skin concerns.    Physical exam:  Vitals: There were no vitals taken for this visit.  GEN: {RFGeneralAppearance:633587}   SKIN: {Skin Exam:416839}  -Swanson skin type: {NOAH NUMERALS I-VI:058376::\"NA\"}  -{Skin Exam Derm:386681}  -{Skin Exam Derm:874767}  -{Skin Exam Derm:076912}  -No other lesions of concern on areas examined.     Impression/Plan:  1. {Diagnosesderm:277197}    {rfplan:132909}    ***    2. {Diagnosesderm:458090}    {rfplan:470584}    ***    3. {Diagnosesderm:282726}    {rfplan:408809}    ***    4. {Diagnosesderm:331079}    ***    ***    CC Referred Self, MD  No address on file on close of this encounter.  Follow-up {rfmultiple:503380} {follow up in days/weeks/months:741915}.       *** staffed the patient.    Staff Involved:  Resident(***)/Staff      "

## 2019-11-07 NOTE — LETTER
11/7/2019       RE: Bubba Branch  [address] [facility] [address]     Dear Colleague,    Thank you for referring your patient, Bubba Branch, to the [facility] DERMATOLOGY at [facility]. Please see a copy of my visit note below.    [facility] Dermatology Note      Dermatology Problem List:  1. Acne vulgaris and sebaceous hyperplasia  - tretinoin 0.025% cream QHS (adapalene if not covered by insurance)  - BP 5% wash, clindamycin 1% lotion in the am   2. Atrophic scars, R temporal scalp. S/p  laser therapy per Dr. Hudson   3. Lipoma, left medial forearm, pending excision in resident clinic     Encounter Date: Nov 7, 2019    CC:   Chief Complaint   Patient presents with     Derm Problem     Bubba is here today for Acne folloe up. Patient notes improvement. Bubba is concerned with a possible lipoma on his forearm.     History of Present Illness:  Mr. Bubba Branch is a 58 year old male who presents as a follow-up for acne vulgaris. The patient was last seen 9/5/19 when we recommended BPO wash, clindamycin lotion, and tretinoin 0.025% cream. He notes good improvement with this and would like refills. He also notes a subcutaneous nodule on the left arm that he would like removed. Occasionally this is painful and irritating. He states this has been biopsied and came back as a lipoma several years go. He also wonders if he can repeat laser therapy to the scars on his forehead/scalp which he has had done in the past with Dr. Hudson. The patient is well today in their baseline state of health, with no additional skin concerns. The patient has not noticed any additional new, growing, changing, itching, bleeding areas on the skin.     Past Medical History:   Patient Active Problem List   Diagnosis     Chemical dependency (H)     CARDIOVASCULAR SCREENING; LDL GOAL LESS THAN 130     Pes planus     Mild major depression (H)     Shoulder pain     Past  Medical History:   Diagnosis Date     Back injury 2008     Chemical dependency (H)      Depression      Head injury 2003, 2008     Past Surgical History:   Procedure Laterality Date     ENT SURGERY       ESOPHAGOSCOPY, GASTROSCOPY, DUODENOSCOPY (EGD), COMBINED N/A 6/29/2016    Procedure: COMBINED ESOPHAGOSCOPY, GASTROSCOPY, DUODENOSCOPY (EGD), BIOPSY SINGLE OR MULTIPLE;  Surgeon: Kim Cisneros MD;  Location:  GI       Social History:  Patient reports that he has quit smoking. He quit after 10.00 years of use. He has never used smokeless tobacco. He reports current alcohol use. He reports that he does not use drugs.    Family History:  Family History   Problem Relation Age of Onset     Diabetes Mother      Heart Disease Mother      Hypertension Father        Medications:  Current Outpatient Medications   Medication Sig Dispense Refill     acyclovir (ZOVIRAX) 200 MG capsule Take 1 capsule by mouth 5 times daily. 25 capsule 5     acyclovir (ZOVIRAX) 400 MG tablet Take 1 tablet by mouth daily. 30 tablet 0     benzoyl peroxide 5 % external liquid Use daily as directed 226 g 11     clindamycin (CLEOCIN T) 1 % external lotion Apply topically 2 times daily 60 mL 11     minocycline (MINOCIN,DYNACIN) 100 MG capsule Take 1 capsule by mouth 2 times daily. 180 capsule 3     Multiple Vitamin (MULTIVITAMINS PO) Take 1 tablet by mouth daily.       omeprazole (PRILOSEC) 40 MG capsule TAKE 1 CAPSULE (40 MG) BY MOUTH DAILY TAKE 30-60 MINUTES BEFORE A MEAL. 90 capsule 2     tretinoin (RETIN-A) 0.025 % cream Apply a pea-sized amount evenly over the face at nighttime before bed. 45 g 2     tretinoin (RETIN-A) 0.025 % external cream Use every night to face, pea sized amount 45 g 11     Ascorbic Acid (VITAMIN C PO) Take 1 tablet by mouth daily.       B Complex Vitamins (VITAMIN-B COMPLEX) TABS Take 1 tablet by mouth daily.       Calcium Carbonate-Vitamin D (CALCIUM + D PO) Take  by mouth.       Cholecalciferol (VITAMIN D PO) Take 1  tablet by mouth daily.          Allergies   Allergen Reactions     Penicillins          Review of Systems:  -As per HPI  -Constitutional: Otherwise feeling well today, in usual state of health.  -Skin: As above in HPI. No additional skin concerns.    Physical exam:  Vitals: There were no vitals taken for this visit.  GEN: This is a well developed, well-nourished male in no acute distress, in a pleasant mood.    SKIN: Acne exam, which includes the face, neck, upper central chest, and upper central back was performed. Additionally examined left arm.   -There are superifical acneiform papules with intermixed open and closed comedones on the nose and cheeks.   -several linear up to 4 cm angulated scars on the temporal parietal scalp.  -two small ovoid scars along medial eyebrow with faint blue discoloration.  -No other lesions of concern on areas examined.     Impression/Plan:  1. Acne vulgaris    Continue current regimen     BPO wash followed by clindamycin lotion in the am daily    Tretinoin 0.025% cream nightly, adapalene if not covered by insurance     2. Lipoma, left medial forearm    Discussed etiology, pathogenesis, and treatment strategies for this common skin condition.     Pending excision in resident clinic by Dr. Roland Thayer     3. Traumatic scars s/p fractional CO2 ablative laser by Dr. Hudson (most recent 9/4/19), would like repeat treatment    Message sent to Gutierrez for scheduling     CC Referred Self, MD  No address on file on close of this encounter.  Follow-up in 2 months for excision earlier for new or changing lesions. Can be seen yearly for refills for acne vulgaris or can follow with PCP.      staffed the patient.    Staff Involved:  Resident(Amy Germain)/Staff    I have personally examined this patient and agree with Dr. Germain's documentation and plan of care. I have reviewed and amended the resident's note above. The documentation accurately reflects my clinical observations,  diagnoses, treatment and follow-up plans.     Genoveva Wilcox MD  Dermatology Staff      Again, thank you for allowing me to participate in the care of your patient.      Sincerely,    Amy Germain MD

## 2020-01-02 ENCOUNTER — OFFICE VISIT (OUTPATIENT)
Dept: DERMATOLOGY | Facility: CLINIC | Age: 60
End: 2020-01-02
Payer: COMMERCIAL

## 2020-01-02 VITALS — HEART RATE: 101 BPM | SYSTOLIC BLOOD PRESSURE: 151 MMHG | DIASTOLIC BLOOD PRESSURE: 95 MMHG

## 2020-01-02 DIAGNOSIS — D17.20 LIPOMA OF UPPER EXTREMITY, UNSPECIFIED LATERALITY: Primary | ICD-10-CM

## 2020-01-02 ASSESSMENT — PAIN SCALES - GENERAL
PAINLEVEL: NO PAIN (0)
PAINLEVEL: NO PAIN (0)

## 2020-01-02 NOTE — NURSING NOTE
Dermatology Rooming Note    Bubba Branch's goals for this visit include:   Chief Complaint   Patient presents with     Derm Problem     Bubba is here today for an excision on his left forearm.     GERRI Rodriguez

## 2020-01-02 NOTE — LETTER
1/2/2020       RE: Bubba Branch  [address] [facility] [address]     Dear Colleague,    Thank you for referring your patient, Bubba Branch, to the [facility] DERMATOLOGY at [facility]. Please see a copy of my visit note below.    DERMATOLOGY EXCISION PROCEDURE NOTE    Dermatology Problem List:    1. Acne vulgaris and sebaceous hyperplasia  - tretinoin 0.025% cream QHS (adapalene if not covered by insurance)  - BP 5% wash, clindamycin 1% lotion in the am   2. Atrophic scars, R temporal scalp. S/p laser therapy per Dr. Hudson   3. Lipoma, left medial forearm, status-post excision on 1/2/2020    NAME OF PROCEDURE: Excision intermediate layered linear closure  Staff surgeon: Dr. Genoveva Wilcox  Resident: Dr. Roland Thayer  Scrub Nurse: Nilsa Sher CMA    PRE-OPERATIVE DIAGNOSIS:  Lipoma, clinically diagnosed  POST-OPERATIVE DIAGNOSIS: Same   LOCATION: Left ulnar wrist  FINAL EXCISION SIZE(DEFECT SIZE): 1.2 cm  MARGIN: 0 cm  FINAL REPAIR LENGTH: 1.2 cm   ANESTHESIA: 9 mL 1% lidocaine with 1:100,000 epinephrine    INDICATIONS: This patient presented with a 4.0 cm x 2.5 cm cm lipoma (clinically diagnosed). Excision was indicated. We discussed the principles of treatment and most likely complications including scarring, bleeding, infection, incomplete excision, wound dehiscence, pain, nerve damage, and recurrence. Informed consent was obtained and the patient underwent the procedure as follows:    PROCEDURE: The patient was taken to the operative suite. Time-out was performed.  The treatment area was anesthetized with 1% lidocaine with epinephrine. The area was prepped with Chlorhexidine and rinsed with sterile saline and draped with sterile towels. The lesion was delineated and excised down to subcutaneous fat in a elliptical manner. Hemostasis was obtained by electrocoagulation.     REPAIR: An intermediate layered linear closure was selected as the procedure which would  maximally preserve both function and cosmesis.    After the excision of the tumor, the area was carefully undermined. Hemostasis was obtained with electrodessication.  Closure was oriented so that the wound was in the patient's natural skin tension lines. The subcutaneous and dermal layers were then closed with 4-0 vicryl sutures. The epidermis was then carefully approximated along the length of the wound using 4-0 prolene simple running sutures.     Estimated blood loss was less than 10 ml for all surgical sites. A sterile pressure dressing was applied and wound care instructions, with a written handout, were given. The [facility] alert and ambulatory.    Follow-up in 2 weeks for suture removal.     Dr. Genoveva Wilcox was immediately available for the entire surgery and was physicially present for the key portions of the procedure.    Anatomic Pathology Results: pending    Clinical Follow-Up: PRN    Staff Involved:  Resident (Dr. Roland Thayer)/Staff (Dr. Genoveva Wilcox)     I have personally examined this patient and agree with Dr. Thayer's documentation and plan of care. I have reviewed and amended the resident's note above. The documentation accurately reflects my clinical observations, diagnoses, treatment and follow-up plans. I was present for key portions of the procedure.       Genoveva Wilcox MD  Dermatology Staff      Again, thank you for allowing me to participate in the care of your patient.      Sincerely,    Mazin Thayer MD

## 2020-01-02 NOTE — PROGRESS NOTES
DERMATOLOGY EXCISION PROCEDURE NOTE    Dermatology Problem List:    1. Acne vulgaris and sebaceous hyperplasia  - tretinoin 0.025% cream QHS (adapalene if not covered by insurance)  - BP 5% wash, clindamycin 1% lotion in the am   2. Atrophic scars, R temporal scalp. S/p laser therapy per Dr. Hudson   3. Lipoma, left medial forearm, status-post excision on 1/2/2020    NAME OF PROCEDURE: Excision intermediate layered linear closure  Staff surgeon: Dr. Genoveva Wilcox  Resident: Dr. Roland Thayer  Scrub Nurse: Nilsa Sher CMA    PRE-OPERATIVE DIAGNOSIS:  Lipoma, clinically diagnosed  POST-OPERATIVE DIAGNOSIS: Same   LOCATION: Left ulnar wrist  FINAL EXCISION SIZE(DEFECT SIZE): 1.2 cm  MARGIN: 0 cm  FINAL REPAIR LENGTH: 1.2 cm   ANESTHESIA: 9 mL 1% lidocaine with 1:100,000 epinephrine    INDICATIONS: This patient presented with a 1.2 cm lipoma (clinically diagnosed). Excision was indicated. We discussed the principles of treatment and most likely complications including scarring, bleeding, infection, incomplete excision, wound dehiscence, pain, nerve damage, and recurrence. Informed consent was obtained and the patient underwent the procedure as follows:    PROCEDURE: The patient was taken to the operative suite. Time-out was performed.  The treatment area was anesthetized with 1% lidocaine with epinephrine. The area was prepped with Chlorhexidine and rinsed with sterile saline and draped with sterile towels. The lesion was delineated and excised down to subcutaneous fat in a elliptical manner. Hemostasis was obtained by electrocoagulation.     REPAIR: An intermediate layered linear closure was selected as the procedure which would maximally preserve both function and cosmesis.    After the excision of the tumor, the area was carefully undermined. Hemostasis was obtained with electrodessication.  Closure was oriented so that the wound was in the patient's natural skin tension lines. The subcutaneous and dermal layers were  then closed with 4-0 vicryl sutures. The epidermis was then carefully approximated along the length of the wound using 4-0 prolene simple running sutures.     Estimated blood loss was less than 10 ml for all surgical sites. A sterile pressure dressing was applied and wound care instructions, with a written handout, were given. The patient was [facility] from the [facility] alert and ambulatory.    Follow-up in 2 weeks for suture removal.     Dr. Genoveva Wilcox was immediately available for the entire surgery and was physicially present for the key portions of the procedure.    Anatomic Pathology Results: pending    Clinical Follow-Up: PRN    Staff Involved:  Resident (Dr. Roland Thayer)/Staff (Dr. Genoveva Wilcox)     I have personally examined this patient and agree with Dr. Thayer's documentation and plan of care. I have reviewed and amended the resident's note above. The documentation accurately reflects my clinical observations, diagnoses, treatment and follow-up plans. I was present for key portions of the procedure.       Genoveva Wilcox MD  Dermatology Staff

## 2020-01-02 NOTE — LETTER
1/2/2020       RE: Bubba Branch  [address] [facility] [address]     Dear Colleague,    Thank you for referring your patient, Bubba Branch, to the [facility] DERMATOLOGY at [facility]. Please see a copy of my visit note below.    DERMATOLOGY EXCISION PROCEDURE NOTE    Dermatology Problem List:    1. Acne vulgaris and sebaceous hyperplasia  - tretinoin 0.025% cream QHS (adapalene if not covered by insurance)  - BP 5% wash, clindamycin 1% lotion in the am   2. Atrophic scars, R temporal scalp. S/p laser therapy per Dr. Hudson   3. Lipoma, left medial forearm, status-post excision on 1/2/2020    NAME OF PROCEDURE: Excision intermediate layered linear closure  Staff surgeon: Dr. Genoveva Wilcox  Resident: Dr. Roland Thayer  Scrub Nurse: Nilsa Sher CMA    PRE-OPERATIVE DIAGNOSIS:  Lipoma, clinically diagnosed  POST-OPERATIVE DIAGNOSIS: Same   LOCATION: Left ulnar wrist  FINAL EXCISION SIZE(DEFECT SIZE): 1.2 cm  MARGIN: 0 cm  FINAL REPAIR LENGTH: 1.2 cm   ANESTHESIA: 9 mL 1% lidocaine with 1:100,000 epinephrine    INDICATIONS: This patient presented with a 4.0 cm x 2.5 cm cm lipoma (clinically diagnosed). Excision was indicated. We discussed the principles of treatment and most likely complications including scarring, bleeding, infection, incomplete excision, wound dehiscence, pain, nerve damage, and recurrence. Informed consent was obtained and the patient underwent the procedure as follows:    PROCEDURE: The patient was taken to the operative suite. Time-out was performed.  The treatment area was anesthetized with 1% lidocaine with epinephrine. The area was prepped with Chlorhexidine and rinsed with sterile saline and draped with sterile towels. The lesion was delineated and excised down to subcutaneous fat in a elliptical manner. Hemostasis was obtained by electrocoagulation.     REPAIR: An intermediate layered linear closure was selected as the procedure which would  maximally preserve both function and cosmesis.    After the excision of the tumor, the area was carefully undermined. Hemostasis was obtained with electrodessication.  Closure was oriented so that the wound was in the patient's natural skin tension lines. The subcutaneous and dermal layers were then closed with 4-0 vicryl sutures. The epidermis was then carefully approximated along the length of the wound using 4-0 prolene simple running sutures.     Estimated blood loss was less than 10 ml for all surgical sites. A sterile pressure dressing was applied and wound care instructions, with a written handout, were given. The patient was [facility] alert and ambulatory.    Follow-up in 2 weeks for suture removal.     Dr. Genoveva Wilcox was immediately available for the entire surgery and was physicially present for the key portions of the procedure.    Anatomic Pathology Results: pending    Clinical Follow-Up: PRN    Staff Involved:  Resident (Dr. Roland Thayer)/Staff (Dr. Genoveva Wilcox)     I have personally examined this patient and agree with Dr. Thayer's documentation and plan of care. I have reviewed and amended the resident's note above. The documentation accurately reflects my clinical observations, diagnoses, treatment and follow-up plans. I was present for key portions of the procedure.       Genoveva Wilcox MD  Dermatology Staff      Again, thank you for allowing me to participate in the care of your patient.      Sincerely,    Mazin Thayer MD

## 2020-01-02 NOTE — Clinical Note
Bryan Wilcox, could you please sign this addendum, which has the correct lesion length? Thank you!Roland

## 2020-01-02 NOTE — NURSING NOTE
Lidocaine-epinephrine 1-1:939552 % injection   9mL once for one use, starting 1/2/2020 ending 1/2/2020,  2mL disp, R-0, injection  Injected by Dr. Thayer

## 2020-01-02 NOTE — PATIENT INSTRUCTIONS

## 2020-01-02 NOTE — LETTER
1/2/2020      RE: Bubba Branch  [address] [facility] [address]       DERMATOLOGY EXCISION PROCEDURE NOTE    Dermatology Problem List:    1. Acne vulgaris and sebaceous hyperplasia  - tretinoin 0.025% cream QHS (adapalene if not covered by insurance)  - BP 5% wash, clindamycin 1% lotion in the am   2. Atrophic scars, R temporal scalp. S/p laser therapy per Dr. Hudson   3. Lipoma, left medial forearm, status-post excision on 1/2/2020    NAME OF PROCEDURE: Excision intermediate layered linear closure  Staff surgeon: Dr. Genoveva Wilcox  Resident: Dr. Roland Thayer  Scrub Nurse: Nilsa Sher CMA    PRE-OPERATIVE DIAGNOSIS:  Lipoma, clinically diagnosed  POST-OPERATIVE DIAGNOSIS: Same   LOCATION: Left ulnar wrist  FINAL EXCISION SIZE(DEFECT SIZE): 1.2 cm  MARGIN: 0 cm  FINAL REPAIR LENGTH: 1.2 cm   ANESTHESIA: 9 mL 1% lidocaine with 1:100,000 epinephrine    INDICATIONS: This patient presented with a 4.0 cm x 2.5 cm cm lipoma (clinically diagnosed). Excision was indicated. We discussed the principles of treatment and most likely complications including scarring, bleeding, infection, incomplete excision, wound dehiscence, pain, nerve damage, and recurrence. Informed consent was obtained and the patient underwent the procedure as follows:    PROCEDURE: The patient was taken to the operative suite. Time-out was performed.  The treatment area was anesthetized with 1% lidocaine with epinephrine. The area was prepped with Chlorhexidine and rinsed with sterile saline and draped with sterile towels. The lesion was delineated and excised down to subcutaneous fat in a elliptical manner. Hemostasis was obtained by electrocoagulation.     REPAIR: An intermediate layered linear closure was selected as the procedure which would maximally preserve both function and cosmesis.    After the excision of the tumor, the area was carefully undermined. Hemostasis was obtained with electrodessication.  Closure was oriented so that  the wound was in the patient's natural skin tension lines. The subcutaneous and dermal layers were then closed with 4-0 vicryl sutures. The epidermis was then carefully approximated along the length of the wound using 4-0 prolene simple running sutures.     Estimated blood loss was less than 10 ml for all surgical sites. A sterile pressure dressing was applied and wound care instructions, with a written handout, were given. The patient was discharged from the [facility] alert and ambulatory.    Follow-up in 2 weeks for suture removal.     Dr. Genoveva Wilcox was immediately available for the entire surgery and was physicially present for the key portions of the procedure.    Anatomic Pathology Results: pending    Clinical Follow-Up: PRN    Staff Involved:  Resident (Dr. Roland Thayer)/Staff (Dr. Genoveva Wilcox)     I have personally examined this patient and agree with Dr. Thayer's documentation and plan of care. I have reviewed and amended the resident's note above. The documentation accurately reflects my clinical observations, diagnoses, treatment and follow-up plans. I was present for key portions of the procedure.       Genoveva Wilcox MD  Dermatology Staff      Mazin Thayer MD

## 2020-01-03 LAB — COPATH REPORT: NORMAL

## 2020-01-06 NOTE — RESULT ENCOUNTER NOTE
The biopsy pathology results have been reviewed and demonstrate a lipoma, which is what was clinically suspected. The patient was informed of the result via [employer] message.    Mazin Thayer MD 1/5/2020  Dermatology Resident

## 2020-01-16 ENCOUNTER — ALLIED HEALTH/NURSE VISIT (OUTPATIENT)
Dept: DERMATOLOGY | Facility: CLINIC | Age: 60
End: 2020-01-16
Payer: COMMERCIAL

## 2020-01-16 DIAGNOSIS — Z48.02 VISIT FOR SUTURE REMOVAL: Primary | ICD-10-CM

## 2020-01-16 NOTE — PROGRESS NOTES
Bubba Branch comes into clinic today at the request of Dr. Wilcox Ordering Provider for Suture Removal: Site was dry, clean and intact, site cleansed with alcohol and sutures were removed. Pt tolerated the procedure. Vaseline and band-aid applied. Patient instructed to leave site covered for 24 hours. Patient advised to call with any questions or concerns.     This service provided today was under the supervising provider of the day Dr. Way, who was available if needed.    Ambika Merrill RN

## 2020-08-31 ENCOUNTER — TELEPHONE (OUTPATIENT)
Dept: DERMATOLOGY | Facility: CLINIC | Age: 60
End: 2020-08-31

## 2020-08-31 NOTE — TELEPHONE ENCOUNTER
Spoke with pt about upcoming visit. He denies having a tan from the sun. States he pays out of pocket for procedure.

## 2020-09-02 ENCOUNTER — OFFICE VISIT (OUTPATIENT)
Dept: DERMATOLOGY | Facility: CLINIC | Age: 60
End: 2020-09-02
Payer: COMMERCIAL

## 2020-09-02 DIAGNOSIS — L90.5 SCAR: Primary | ICD-10-CM

## 2020-09-02 NOTE — NURSING NOTE
Dermatology Laser Intake Checklist:  History of psoriasis:No  History of recent tan, indoor or outdoor tanning/vacation or other sun exposure: No  History of vitiligo:No  Family history of vitiligo:No  Recent other cosmetic procedure(microderm abrasion/peel/hair removal/facial etc):No  History of HSV:Yes   Did the patient start valtrex: No  For genital laser hair removal patient only: Is there a history of genital warts or condyloma:No  Tattoo in the area to be treated:No  Is patient using hydroquinone:No  Retinoids and other acne medications stopped for 2 weeks:No  Has the patient had accutane in the last 6-12 months:No  Pregnant or breastfeeding: No  History of skin cancer in area planned for treatment: No  History of treatment with gold:No  Changes in medical history: No  Photos obtained: Yes  Does the patient smoke:No  Is the patient on ibuprofen/aspirin/plavix/coumadin/other blood thinner: No  If patient is taking narcotic or diazepam(valium)-does patient have : No  There were no vitals taken for this visit.

## 2020-09-02 NOTE — LETTER
9/2/2020       RE: Bubba Branch  [address] [facility] [address]     Dear Colleague,    Thank you for referring your patient, Bubba Branch, to the [facility] Please see a copy of my visit note below.    CORE Procedure: Cosmetic    1. Acne vulgaris with sebaceous hyperplasia.   - tretinoin 0.025% cream QHS started 3/15/18  - BP 5% wash, clindamycin 1% lotion  2. Atrophic scar, R temporal scalp. Referral to [facility].       Procedure Date: 09/02/2020  Staff:Reba Hudson MD  Resident/Fellow:  None    Procedure:   CO2RE, fractional CO2 ablative laser treatment # 3 {NUMB     Anesthesia and Premedication:  Anesthesia (topical): Benzocaine 20%/Lidocaine 8%/Tetracaine 4% ointment       Device Settings:  Diagnosis:   Location:scalp 3 linear scars  Mode(class/deep/mid/etc): deep  Frational coverage(%): 5%  Core(mJ): 70  1 pass      Fotofinder photos: No    Description of Procedure:   The nature and purpose of the procedure, associated risks, possible consequences, complications, and alternative methods of treatment were explained in detail including but not limited to redness, swelling, peeling of the skin, eye injury, infection, bleeding, oozing, heat sensation, itching or acne.  Possible outcomes were reviewed including the following: no improvement, slight improvement, skin lightening or darkening. The possibility of permanent scarring was reviewed. Discussion that multiple treatments may be required was completed.     Photo consent was obtained and reviewed, a time out was performed, and informed consent was obtained.  The area was cleansed with [employer]. Protective eyewear was worn by the patient and all personnel in the treatment room  The patient confirmed the site to be treated. The laser energy output was verified by meter reading. The [employer] cooler and smoke evacuator devices were operated coincident with the CO2RE laser. The areas  were treated with CO2RE laser as described.   Cold compresses then aquaphor was applied in a thin layer. The patient tolerated the procedure well and no complications were noted. Verbal and written aftercare instructions were provided. Post procedure care including use of mild, gentle cleansers and moisturizers  for the first week following treatment was reviewed. The patient was recommend application of at least SPF 30 sunscreen daily  and avoidance of direct sunlight up to 3 months post procedure.  The patient was discharged from the dermatology clinic in good condition.    The patient will follow up with nursing in 48 hours and with MD in 2 day and 8weeks.     WE could consider use of filler for depression, however, this will likely improved with continued CO2.  He will also see plastics to see if they have any additional issues  Staff Involved:  Staff Only    Reba Hudson MD    Department of Dermatology  [facility]: Phone: [phone], Fax:[phone]  [facility]: Phone: [phone], Fax: [phone]                          Again, thank you for allowing me to participate in the care of your patient.      Sincerely,    Reba Hudson MD

## 2020-09-02 NOTE — PROGRESS NOTES
CORE Procedure: Cosmetic    1. Acne vulgaris with sebaceous hyperplasia.   - tretinoin 0.025% cream QHS started 3/15/18  - BP 5% wash, clindamycin 1% lotion  2. Atrophic scar, R temporal scalp. Referral to [facility].       Procedure Date: 09/02/2020  Staff:Reba Hudson MD  Resident/Fellow:  None    Procedure:   CO2RE, fractional CO2 ablative laser treatment # 3 {NUMB     Anesthesia and Premedication:  Anesthesia (topical): Benzocaine 20%/Lidocaine 8%/Tetracaine 4% ointment       Device Settings:  Diagnosis:   Location:scalp 3 linear scars  Mode(class/deep/mid/etc): deep  Frational coverage(%): 5%  Core(mJ): 70  1 pass      Fotofinder photos: No    Description of Procedure:   The nature and purpose of the procedure, associated risks, possible consequences, complications, and alternative methods of treatment were explained in detail including but not limited to redness, swelling, peeling of the skin, eye injury, infection, bleeding, oozing, heat sensation, itching or acne.  Possible outcomes were reviewed including the following: no improvement, slight improvement, skin lightening or darkening. The possibility of permanent scarring was reviewed. Discussion that multiple treatments may be required was completed.     Photo consent was obtained and reviewed, a time out was performed, and informed consent was obtained.  The area was cleansed with [employer]. Protective eyewear was worn by the patient and all personnel in the treatment room  The patient confirmed the site to be treated. The laser energy output was verified by meter reading. The [employer] cooler and smoke evacuator devices were operated coincident with the CO2RE laser. The areas were treated with CO2RE laser as described.   Cold compresses then aquaphor was applied in a thin layer. The patient tolerated the procedure well and no complications were noted. Verbal and written aftercare instructions were provided. Post procedure care including use of mild,  gentle cleansers and moisturizers  for the first week following treatment was reviewed. The patient was recommend application of at least SPF 30 sunscreen daily  and avoidance of direct sunlight up to 3 months post procedure.  The patient was discharged from the dermatology clinic in good condition.    The patient will follow up with nursing in 48 hours and with MD in 2 day and 8weeks.     WE could consider use of filler for depression, however, this will likely improved with continued CO2.  He will also see plastics to see if they have any additional issues  Staff Involved:  Staff Only    Reba Hudson MD    Department of Dermatology  [facility]: Phone: [phone], Fax:[phone]  [facility]: Phone: [phone], Fax: [phone]

## 2020-09-02 NOTE — LETTER
Date:September 30, 2020      Patient was self referred, no letter generated. Do not send.        [facility] Health Information

## 2020-09-02 NOTE — PATIENT INSTRUCTIONS
CO2RE Laser    I will experience redness, swelling, peeling, pain, and heat sensation. I may experience bleeding, oozing, itching, or acne. Risks are blistering, permanent scarring, temporary or permanent skin lightening or darkening, infection, and eye injury. I understand my outcome could be no improvement, slight improvement. Multiple treatments may be required.    TWO WEEKS BEFORE TREATMENT    Stop use of all Retinols   o Retin A, Tazorac,  anti-aging  products    Stop use of all glycolic acid treatments (longer if deeper peel)    Stop use of all salicylic acid products    Stop excessive sun exposure 8 weeks prior to treatment    Stop waxing    Stop abrasive scrubs    Stop microdermabrasion treatments    Stop hydroquinone(bleaching cream) 3 day sprior    Men should not shave 24 hours prior.      and bring  o Aquaphor   o Cetaphil cream  o Cetaphil gentle facial cleanser    THE DAY OF TREATMENT    Come to the appointment with no make-up, lotions or other products on the face    Take to your doctor about pain control after the procedure    If taking pain medication, please, bring a  or we will be unable to do the procedure      AFTER CARE INSTRUCTIONS    - Avoid the sun    - You may start wound care after 24 hours or sooner if comfortable.    - The first 3 days, start soaks, twice daily, soak for a few minutes with a clean cloth saturated with a dilute vinegar solution to help prevent infection.    Mix 2 tablespoons of household white vinegar in 2 cups of water.    Prepare fresh each day     Apply  Aquaphor to protect and soften the peeling skin.   - After 3 days, cleanse the face water with water and a mild/gentle cleanser (i.e. Vanicream facial cleanser or Cetaphil facial cleanserproducts) once daily. Then, apply a thin layer of Cetaphil cream several times a day for healing and comfort.Once the skin sloughing and flaking is over, ointment (Aquaphor and epidermal repair cream) is no longer  needed  - One sloughing and flaking is over sunblock  must be applied. Resume routine skin care and use of cosmetics as tolerated   - In some cases use of a bleaching cream may be recommended to start as well.  - Men may begin using an electric razor if they prefer to shave after 1 week.   - Apply sunblock every day and reapply every 2 hours when outside. Sun exposure can cause dark brown discoloration.    Reapply several times per day every day regardless of weather or indoors.    Use sunblock for face, SPF at least 30, broad spectrum (UVA & UVB)    Use diligently at least 3 months and avoid direct sun exposure.    Use a broad-rim hat if outdoors for a long time.      Who should I call with questions?       [facility]: [phone]       [facility]: [phone]       For urgent needs outside of business hours call the [facility] at [phone]        and ask for the dermatology resident on call

## 2020-09-15 ENCOUNTER — TELEPHONE (OUTPATIENT)
Dept: SURGERY | Facility: CLINIC | Age: 60
End: 2020-09-15

## 2020-09-17 NOTE — TELEPHONE ENCOUNTER
Pt called, No answer.  does not identify pt. Left general message for pt to call the [facility] clinic back at [phone].....Lindsay Garcia RN

## 2020-09-29 NOTE — TELEPHONE ENCOUNTER
Pt called, No answer.  does not identify pt. Left general message for pt to call the [facility] back at [phone]. Multiple attempts made to contact pt and messages left. Will close this encounter and reopen when pt returns the call...Lindsay Garcia RN

## 2021-01-13 NOTE — TELEPHONE ENCOUNTER
FUTURE VISIT INFORMATION      FUTURE VISIT INFORMATION:    Date: 2/2/21    Time: 11 AM    Location: [facility]  REFERRAL INFORMATION:    Referring provider:  Dr. Reba Hudson    Referring providers clinic:  [facility] - Dermatology    Reason for visit/diagnosis: Scar Revision    RECORDS REQUESTED FROM:       Clinic name Comments Records Status Imaging Status   [facility] 9/2/20, 9/4/19 - DERM OV with Dr. Hudson  3/15/18 - DERM OV with Dr. Barron [facility]  1/26/18 - DERM OV with Dr. Aguilera [facility] Everywhere

## 2021-01-15 ENCOUNTER — HEALTH MAINTENANCE LETTER (OUTPATIENT)
Age: 61
End: 2021-01-15

## 2021-01-20 ENCOUNTER — TELEPHONE (OUTPATIENT)
Dept: PLASTIC SURGERY | Facility: CLINIC | Age: 61
End: 2021-01-20

## 2021-01-20 NOTE — TELEPHONE ENCOUNTER
LVM for patient regarding appointment with Dr. Daley on 2/2/21 at 11 AM. Dr. Daley feels patient should be seen by Dr. Nixon instead. Patient to call back to reschedule appointment with Dr. Nixon.     Carlito Ardon LPN

## 2021-01-29 ENCOUNTER — OFFICE VISIT (OUTPATIENT)
Dept: PLASTIC SURGERY | Facility: CLINIC | Age: 61
End: 2021-01-29
Attending: DERMATOLOGY
Payer: COMMERCIAL

## 2021-01-29 VITALS
HEART RATE: 108 BPM | WEIGHT: 185 LBS | BODY MASS INDEX: 27.4 KG/M2 | DIASTOLIC BLOOD PRESSURE: 80 MMHG | SYSTOLIC BLOOD PRESSURE: 131 MMHG | HEIGHT: 69 IN | OXYGEN SATURATION: 98 %

## 2021-01-29 DIAGNOSIS — L90.5 SCAR OF SCALP: Primary | ICD-10-CM

## 2021-01-29 PROCEDURE — 99203 OFFICE O/P NEW LOW 30 MIN: CPT | Performed by: PLASTIC SURGERY

## 2021-01-29 ASSESSMENT — PAIN SCALES - GENERAL: PAINLEVEL: NO PAIN (0)

## 2021-01-29 ASSESSMENT — MIFFLIN-ST. JEOR: SCORE: 1644.53
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
## 2021-01-29 NOTE — NURSING NOTE
"Chief Complaint   Patient presents with     Consult     scar/raised area on R frontotemporal scalp       Vitals:    01/29/21 1320   BP: 131/80   BP Location: Left arm   Patient Position: Chair   Cuff Size: Adult Regular   Pulse: 108   SpO2: 98%   Weight: 83.9 kg (185 lb)   Height: 1.753 m (5' 9\")       Body mass index is 27.32 kg/m .    Pieter Rodgers EMT    "
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
## 2021-02-01 ENCOUNTER — TELEPHONE (OUTPATIENT)
Dept: DERMATOLOGY | Facility: CLINIC | Age: 61
End: 2021-02-01

## 2021-02-01 NOTE — TELEPHONE ENCOUNTER
Health Call Center    Phone Message    May a detailed message be left on voicemail: no     Reason for Call: Appointment Intake    Referring Provider Name:   Pt has seen  Tristan for laser treatment    Diagnosis and/or Symptoms:   Laser treatment for scarring    Action Taken: Message routed to:  [facility] Dermatology    Travel Screening: Not Applicable     Return Pt of  Tristan wishes to have a Follow-up appt with her for laser treatment for scarring. Scheduling protocols show to sent a TE to clinic.    Pt wants reply by Kim and says he has open availability.    Thank you!

## 2021-02-02 ENCOUNTER — PRE VISIT (OUTPATIENT)
Dept: SURGERY | Facility: CLINIC | Age: 61
End: 2021-02-02

## 2021-03-02 ASSESSMENT — ENCOUNTER SYMPTOMS
WHEEZING: 1
EXERCISE INTOLERANCE: 0
SLEEP DISTURBANCES DUE TO BREATHING: 0
BACK PAIN: 1
LEG PAIN: 0
HYPOTENSION: 0
JOINT SWELLING: 0
SNORES LOUDLY: 0
MYALGIAS: 0
HEMOPTYSIS: 0
NERVOUS/ANXIOUS: 0
DEPRESSION: 0
MUSCLE CRAMPS: 0
NECK PAIN: 0
INSOMNIA: 0
STIFFNESS: 0
PALPITATIONS: 1
SHORTNESS OF BREATH: 0
LIGHT-HEADEDNESS: 0
COUGH DISTURBING SLEEP: 1
HYPERTENSION: 0
DYSPNEA ON EXERTION: 0
MUSCLE WEAKNESS: 0
PANIC: 0
ORTHOPNEA: 0
ARTHRALGIAS: 0
SPUTUM PRODUCTION: 0
POSTURAL DYSPNEA: 0
SYNCOPE: 0
COUGH: 1
DECREASED CONCENTRATION: 1

## 2021-03-09 ENCOUNTER — OFFICE VISIT (OUTPATIENT)
Dept: PLASTIC SURGERY | Facility: CLINIC | Age: 61
End: 2021-03-09
Attending: PLASTIC SURGERY
Payer: COMMERCIAL

## 2021-03-09 VITALS
WEIGHT: 174.7 LBS | DIASTOLIC BLOOD PRESSURE: 83 MMHG | BODY MASS INDEX: 25.87 KG/M2 | TEMPERATURE: 97.9 F | OXYGEN SATURATION: 98 % | RESPIRATION RATE: 16 BRPM | HEART RATE: 102 BPM | HEIGHT: 69 IN | SYSTOLIC BLOOD PRESSURE: 123 MMHG

## 2021-03-09 DIAGNOSIS — L90.5 SCAR OF SCALP: Primary | ICD-10-CM

## 2021-03-09 PROCEDURE — 99214 OFFICE O/P EST MOD 30 MIN: CPT | Performed by: PLASTIC SURGERY

## 2021-03-09 PROCEDURE — G0463 HOSPITAL OUTPT CLINIC VISIT: HCPCS

## 2021-03-09 ASSESSMENT — PAIN SCALES - GENERAL: PAINLEVEL: NO PAIN (0)

## 2021-03-09 ASSESSMENT — MIFFLIN-ST. JEOR: SCORE: 1598.06
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
## 2021-03-17 ENCOUNTER — TELEPHONE (OUTPATIENT)
Dept: DERMATOLOGY | Facility: CLINIC | Age: 61
End: 2021-03-17

## 2021-03-17 ENCOUNTER — OFFICE VISIT (OUTPATIENT)
Dept: DERMATOLOGY | Facility: CLINIC | Age: 61
End: 2021-03-17

## 2021-03-17 DIAGNOSIS — L90.5 SCAR: Primary | ICD-10-CM

## 2021-03-17 PROCEDURE — 96999 UNLISTED SPEC DERM SVC/PX: CPT | Performed by: DERMATOLOGY

## 2021-03-17 PROCEDURE — 11900 INJECT SKIN LESIONS </W 7: CPT | Performed by: DERMATOLOGY

## 2021-03-17 ASSESSMENT — PATIENT HEALTH QUESTIONNAIRE - PHQ9: SUM OF ALL RESPONSES TO PHQ QUESTIONS 1-9: 0

## 2021-03-17 NOTE — PROGRESS NOTES
CORE Procedure: Cosmetic    Procedure Date: 03/17/2021  Staff: Reba Hudson MD  Resident/Fellow:  Sloane Gallagher MD    Procedure:   CO2RE, fractional CO2 ablative laser treatment #  4    Anesthesia and Premedication:  Anesthesia (topical): Benzocaine 20%/Lidocaine 8%/Tetracaine 4% ointment    Device Settings:  Diagnosis:   Location:scalp 3 linear scars  Mode(class/deep/mid/etc): deep  Frational coverage(%): 4%  Core(mJ): 36  1 pass       Fotofinder photos: No    Description of Procedure:   The nature and purpose of the procedure, associated risks, possible consequences, complications, and alternative methods of treatment were explained in detail including but not limited to redness, swelling, eye injury, infection, bleeding, oozing. Possible outcomes were reviewed including the following: no improvement, slight improvement, skin lightening or darkening. The possibility of permanent scarring was reviewed. Discussion that multiple treatments may be required was completed.      Photo consent was obtained and reviewed, a time out was performed, and informed consent was obtained.  The area was cleansed with [employer]icare. Protective eyewear was worn by the patient and all personnel in the treatment room  The patient confirmed the site to be treated. The laser energy output was verified by meter reading. The [employer] cooler and smoke evacuator devices were operated coincident with the CO2RE laser. The areas were treated with CO2RE laser as described.   Cold compresses then aquaphor was applied in a thin layer. The patient tolerated the procedure well and no complications were noted. Verbal and written aftercare instructions were provided. Post procedure care including use of mild, gentle cleansers and moisturizers  for the first week following treatment was reviewed. The patient was recommend application of at least SPF 30 sunscreen daily  and avoidance of direct sunlight up to 3 months post procedure.  The patient was  discharged from the dermatology clinic in good condition.    After Laser:     ILK was applied to slightly hypertophic scar post procedure.   - Laser assisted drug delivery triamcinolone procedure note. After positioning and cleansing with isopropyl alcohol, 0.4 total mL of triamcinolone 5 mg/mL was applied into 1 lesion(s) on the right frontal scalp. The patient tolerated the procedure well and left the dermatology clinic in good condition.          Staff Involved:  Resident/Staff     Sloane Gallagher MD  Dermatology Resident, PGY2  Staff Physician Comments:   I saw and evaluated the patient with the resident and I agree with the assessment and plan.  The resident assisted with the patients verbal permission.   Reba Hudson MD    Department of Dermatology  Formerly named [facility]: Phone: [phone], Fax:[phone]  [facility]: Phone: [phone], Fax: [phone]

## 2021-03-17 NOTE — NURSING NOTE
Chief Complaint   Patient presents with     Derm Problem     here for core laser treatment on scalp     Justa Vigil, EMT

## 2021-03-17 NOTE — LETTER
3/17/2021       RE: Bubba Branch  [address] [facility] [address]     Dear Colleague,    Thank you for referring your patient, Bubba Branch, to the [facility] DERMATOLOGY CLINIC [place] at [facility]. Please see a copy of my visit note below.    Dermatology Laser Intake Checklist:  History of psoriasis:No  History of recent tan, indoor or outdoor tanning/vacation or other sun exposure: No  History of vitiligo:No  Family history of vitiligo:No  Recent other cosmetic procedure(microderm abrasion/peel/hair removal/facial etc):No  History of HSV:No   Did the patient start valtrex: No  For genital laser hair removal patient only: Is there a history of genital warts or condyloma:No  Tattoo in the area to be treated:No  Is patient using hydroquinone:No  Retinoids and other acne medications stopped for 2 weeks:No  Has the patient had accutane in the last 6-12 months:No  Pregnant or breastfeeding: No  History of skin cancer in area planned for treatment: No  History of treatment with gold:No  Changes in medical history: No  Photos obtained: Yes  Does the patient smoke:No  Is the patient on ibuprofen/aspirin/plavix/coumadin/other blood thinner: No  If patient is taking narcotic or diazepam(valium)-does patient have : No  There were no vitals taken for this visit.          CORE Procedure: Cosmetic    Procedure Date: 03/17/2021  Staff: Reba Hudson MD  Resident/Fellow:  Sloane Gallagher MD    Procedure:   CO2RE, fractional CO2 ablative laser treatment #  4    Anesthesia and Premedication:  Anesthesia (topical): Benzocaine 20%/Lidocaine 8%/Tetracaine 4% ointment    Device Settings:  Diagnosis:   Location:scalp 3 linear scars  Mode(class/deep/mid/etc): deep  Frational coverage(%): 4%  Core(mJ): 36  1 pass       Fotofinder photos: No    Description of Procedure:   The nature and purpose of the procedure, associated risks, possible consequences,  complications, and alternative methods of treatment were explained in detail including but not limited to redness, swelling, eye injury, infection, bleeding, oozing. Possible outcomes were reviewed including the following: no improvement, slight improvement, skin lightening or darkening. The possibility of permanent scarring was reviewed. Discussion that multiple treatments may be required was completed.      Photo consent was obtained and reviewed, a time out was performed, and informed consent was obtained.  The area was cleansed with Technicare. Protective eyewear was worn by the patient and all personnel in the treatment room  The patient confirmed the site to be treated. The laser energy output was verified by meter reading. The [employer] cooler and smoke evacuator devices were operated coincident with the CO2RE laser. The areas were treated with CO2RE laser as described.   Cold compresses then aquaphor was applied in a thin layer. The patient tolerated the procedure well and no complications were noted. Verbal and written aftercare instructions were provided. Post procedure care including use of mild, gentle cleansers and moisturizers  for the first week following treatment was reviewed. The patient was recommend application of at least SPF 30 sunscreen daily  and avoidance of direct sunlight up to 3 months post procedure.  The patient was discharged from the dermatology clinic in good condition.    After Laser:     ILK was applied to slightly hypertophic scar post procedure.   - Laser assisted drug delivery triamcinolone procedure note. After positioning and cleansing with isopropyl alcohol, 0.4 total mL of triamcinolone 5 mg/mL was applied into 1 lesion(s) on the right frontal scalp. The patient tolerated the procedure well and left the dermatology clinic in good condition.          Staff Involved:  Resident/Staff     Sloane Gallagher MD  Dermatology Resident, PGY2  Staff Physician Comments:   I saw and  evaluated the patient with the resident and I agree with the assessment and plan.  The resident assisted with the patients verbal permission.   Reba Hudson MD    Department of Dermatology  [facility]: Phone: [phone], Fax:[phone]  [facility]: Phone: [phone], Fax: [phone]                Drug Administration Record    Prior to injection, verified patient identity using patient's name and date of birth.  Due to injection administration, patient instructed to remain in clinic for 15 minutes  afterwards, and to report any adverse reaction to me immediately.    Drug Name: triamcinolone acetonide(kenalog)  Dose: 0.4mL of triamcinolone 5mg/mL, 2.0mg dose  Route administered: ID  NDC #: Kenalog-10 (7968-5789-12)  Amount of waste(mL): 4.8mL  Reason for waste: Single use vial    LOT #: [id number]  SITE: see note  : [employer]  EXPIRATION DATE: JUN2022

## 2021-03-17 NOTE — PATIENT INSTRUCTIONS
CO2RE Laser    I will experience redness, swelling, peeling, pain, and heat sensation. I may experience bleeding, oozing, itching, or acne. Risks are blistering, permanent scarring, temporary or permanent skin lightening or darkening, infection, and eye injury. I understand my outcome could be no improvement, slight improvement. Multiple treatments may be required.    TWO WEEKS BEFORE TREATMENT    Stop use of all Retinols   o Retin A, Tazorac,  anti-aging  products    Stop use of all glycolic acid treatments (longer if deeper peel)    Stop use of all salicylic acid products    Stop excessive sun exposure 8 weeks prior to treatment    Stop waxing    Stop abrasive scrubs    Stop microdermabrasion treatments    Stop hydroquinone(bleaching cream) 3 day sprior    Men should not shave 24 hours prior.      and bring  o Aquaphor   o Cetaphil cream  o Cetaphil gentle facial cleanser    THE DAY OF TREATMENT    Come to the appointment with no make-up, lotions or other products on the face    Take to your doctor about pain control after the procedure    If taking pain medication, please, bring a  or we will be unable to do the procedure      AFTER CARE INSTRUCTIONS    - Avoid the sun    - You may start wound care after 24 hours or sooner if comfortable.    - The first 3 days, start soaks, twice daily, soak for a few minutes with a clean cloth saturated with a dilute vinegar solution to help prevent infection.    Mix 2 tablespoons of household white vinegar in 2 cups of water.    Prepare fresh each day     Apply  Aquaphor to protect and soften the peeling skin.   - After 3 days, cleanse the face water with water and a mild/gentle cleanser (i.e. Vanicream facial cleanser or Cetaphil facial cleanserproducts) once daily. Then, apply a thin layer of Cetaphil cream several times a day for healing and comfort.Once the skin sloughing and flaking is over, ointment (Aquaphor and epidermal repair cream) is no longer  needed  - One sloughing and flaking is over sunblock  must be applied. Resume routine skin care and use of cosmetics as tolerated   - In some cases use of a bleaching cream may be recommended to start as well.  - Men may begin using an electric razor if they prefer to shave after 1 week.   - Apply sunblock every day and reapply every 2 hours when outside. Sun exposure can cause dark brown discoloration.    Reapply several times per day every day regardless of weather or indoors.    Use sunblock for face, SPF at least 30, broad spectrum (UVA & UVB)    Use diligently at least 3 months and avoid direct sun exposure.    Use a broad-rim hat if outdoors for a long time.      Who should I call with questions?       [facility]: [phone]       Health system: [phone]       For urgent needs outside of business hours call the [facility] at [phone]        and ask for the dermatology resident on call

## 2021-03-17 NOTE — TELEPHONE ENCOUNTER
Message  Received: Today  Message Contents   Justa Vigil, EMT  [facility] [place]             Phtoos and phone gurjit tomorrow post co2 check      Called patient and left message to call back and schedule a phone visit with photos with Dr Hudson on 3/18/21.    Provided direct call back number for scheduling.      Lindsay Kelly  Surgical Specialties Procedure   [facility]  3/17/2021 2:43 PM

## 2021-03-17 NOTE — PROGRESS NOTES
Dermatology Laser Intake Checklist:  History of psoriasis:No  History of recent tan, indoor or outdoor tanning/vacation or other sun exposure: No  History of vitiligo:No  Family history of vitiligo:No  Recent other cosmetic procedure(microderm abrasion/peel/hair removal/facial etc):No  History of HSV:No   Did the patient start valtrex: No  For genital laser hair removal patient only: Is there a history of genital warts or condyloma:No  Tattoo in the area to be treated:No  Is patient using hydroquinone:No  Retinoids and other acne medications stopped for 2 weeks:No  Has the patient had accutane in the last 6-12 months:No  Pregnant or breastfeeding: No  History of skin cancer in area planned for treatment: No  History of treatment with gold:No  Changes in medical history: No  Photos obtained: Yes  Does the patient smoke:No  Is the patient on ibuprofen/aspirin/plavix/coumadin/other blood thinner: No  If patient is taking narcotic or diazepam(valium)-does patient have : No  There were no vitals taken for this visit.

## 2021-03-17 NOTE — PROGRESS NOTES
Drug Administration Record    Prior to injection, verified patient identity using patient's name and date of birth.  Due to injection administration, patient instructed to remain in clinic for 15 minutes  afterwards, and to report any adverse reaction to me immediately.    Drug Name: triamcinolone acetonide(kenalog)  Dose: 0.4mL of triamcinolone 5mg/mL, 2.0mg dose  Route administered: ID  NDC #: Kenalog-10 (4158-8897-16)  Amount of waste(mL): 4.8mL  Reason for waste: Single use vial    LOT #: [id number]  SITE: see note  : [employer]  EXPIRATION DATE: JUN2022

## 2021-03-18 NOTE — TELEPHONE ENCOUNTER
2nd attempt to schedule as noted below. Message left for patient to return call and schedule.    Lindsay Kelly  Surgical Specialties Procedure   [facility] [place]  3/18/2021 10:15 AM

## 2021-03-19 ENCOUNTER — MYC MEDICAL ADVICE (OUTPATIENT)
Dept: DERMATOLOGY | Facility: CLINIC | Age: 61
End: 2021-03-19

## 2021-04-08 ENCOUNTER — OFFICE VISIT (OUTPATIENT)
Dept: DERMATOLOGY | Facility: CLINIC | Age: 61
End: 2021-04-08
Payer: COMMERCIAL

## 2021-04-08 DIAGNOSIS — L91.8 INFLAMED ACROCHORDON: ICD-10-CM

## 2021-04-08 DIAGNOSIS — L84 CORN OF TOE: Primary | ICD-10-CM

## 2021-04-08 DIAGNOSIS — L70.0 ACNE VULGARIS: ICD-10-CM

## 2021-04-08 PROCEDURE — 11200 RMVL SKIN TAGS UP TO&INC 15: CPT | Mod: GC | Performed by: DERMATOLOGY

## 2021-04-08 PROCEDURE — 99213 OFFICE O/P EST LOW 20 MIN: CPT | Mod: 25 | Performed by: DERMATOLOGY

## 2021-04-08 RX ORDER — UREA 40 %
CREAM (GRAM) TOPICAL DAILY
Qty: 120 G | Refills: 4 | Status: SHIPPED | OUTPATIENT
Start: 2021-04-08

## 2021-04-08 ASSESSMENT — PAIN SCALES - GENERAL: PAINLEVEL: MILD PAIN (3)

## 2021-04-08 NOTE — LETTER
4/8/2021       RE: Bubba Branch  [address] [facility] [address]     Dear Colleague,    Thank you for referring your patient, Bubba Branch, to the [facility] DERMATOLOGY [facility]IN[facility] [place] at [facility]. Please see a copy of my visit note below.    [facility] Dermatology Note  Encounter Date: Apr 8, 2021  Office Visit     Dermatology Problem List:    1. Acne vulgaris and sebaceous hyperplasia  - tretinoin 0.025% cream QHS (adapalene if not covered by insurance)  - BP 5% wash, clindamycin 1% lotion in the am   2. Atrophic scars, R temporal scalp. S/p laser therapy per Dr. Hudson   3. Lipoma, left medial forearm, status-post excision on 1/2/2020  4. [place] of the right third toe: urea 40% cream, daily foot soaks, and corn razor daily  ____________________________________________    Assessment & Plan:     1. Acne vulgaris  - Refilled BPO was as below  - benzoyl peroxide 5 % external liquid; Use daily as directed  Dispense: 226 g; Refill: 11    2. Corn of toe  - We offered to pare the skin lesion, but the patient declined and preferred conservative management with topicals only as below.  - Perform daily foot soaks, use corn razor, and apply urea 40% cream daily and cover with Saran wrap  - Urea 40 % CREA; Externally apply topically daily To the corn on the toe as needed for up to 6-12 weeks  Dispense: 120 g; Refill: 4    3. Inflamed acrochordon  - We treated the inflamed acrochordon at the left superior buttock with LN2 today. See procedure note below.  - DESTRUCT BENIGN LESION, UP TO 14      Procedures Performed:   - Cryotherapy procedure note, location(s): left superior buttock. After verbal consent and discussion of risks and benefits including, but not limited to, dyspigmentation/scar, blister, and pain, 1 skin lesion(s) was(were) treated with 1-2 mm freeze border for 1-2 cycles with liquid nitrogen. Post cryotherapy  "instructions were provided.    Follow-up: 1 year(s) in-person, or earlier for new or changing lesions    Staff and Resident:     Dr. Roland Thayer (resident)  Dr. Otf West (staff)  ____________________________________________    CC: Derm Problem (Bubba has a growth on his toe. He states \" I have had the growth x2 months.\")    HPI:  Mr. Bubba Branch is a(n) 60 year old male who presents today as a return patient for multiple skin problems.    He says that he over the last 3-4 months has had a corn arise at the right third toe. He denies wearing tight fitting shoes, but says that he has another corn on this foot as well. He has over the last 10 days been using Dr. Crawford's corn remover, which he says has led to some slight pain at the area now. He denies skin lesions on the opposite foot. Otherwise, he says he would like a refill of BPO wash for acne vulgaris. He also states for the last several months, he has noticed a fleshy bump at the left superior buttock that becomes irritated by rubbing against the waist line of his trousers. He would like it removed, if possible, due to these symptoms.    Patient is otherwise feeling well, without additional skin concerns.    Labs Reviewed:  N/A    Physical Exam:  Vitals: There were no vitals taken for this visit.  SKIN: Focused examination of face, neck, trunk, buttocks, and right foot was performed.  - At the right third toe, there is a hyperkeratotic, plate-like nodule with a central plug.  - At the left superior buttock, there is a 2 mm fleshy, pink papule with surrounding erythema that appears irritated clinically.  - No acneiform lesions on the face today.  - No other lesions of concern on areas examined.     Medications:  Current Outpatient Medications   Medication     acyclovir (ZOVIRAX) 400 MG tablet     benzoyl peroxide 5 % external liquid     Multiple Vitamin (MULTIVITAMINS PO)     omeprazole (PRILOSEC) 40 MG capsule     Current Facility-Administered " Medications   Medication     triamcinolone acetonide (KENALOG-10) injection 10 mg      Past Medical History:   Patient Active Problem List   Diagnosis     Chemical dependency (H)     CARDIOVASCULAR SCREENING; LDL GOAL LESS THAN 130     Pes planus     Mild major depression (H)     Shoulder pain     Facial bones, closed fracture (H)     Past Medical History:   Diagnosis Date     Back injury 2008     Chemical dependency (H)      Depression      Head injury 2003, 2008       CC Referred Self, MD  No address on file on close of this encounter.

## 2021-04-08 NOTE — NURSING NOTE
"Dermatology Rooming Note    Bubba Branch's goals for this visit include:   Chief Complaint   Patient presents with     Derm Problem     Bubba has a growth on his toe. He states \" I have had the growth x2 months.\"     Mirella Escamilla, RMSUJATA  "

## 2021-04-08 NOTE — PROGRESS NOTES
"[facility] Dermatology Note  Encounter Date: Apr 8, 2021  Office Visit     Dermatology Problem List:    1. Acne vulgaris and sebaceous hyperplasia  - tretinoin 0.025% cream QHS (adapalene if not covered by insurance)  - BP 5% wash, clindamycin 1% lotion in the am   2. Atrophic scars, R temporal scalp. S/p laser therapy per Dr. Hudson   3. Lipoma, left medial forearm, status-post excision on 1/2/2020  4. [place] of the right third toe: urea 40% cream, daily foot soaks, and corn razor daily  ____________________________________________    Assessment & Plan:     1. Acne vulgaris  - Refilled BPO was as below  - benzoyl peroxide 5 % external liquid; Use daily as directed  Dispense: 226 g; Refill: 11    2. Corn of toe  - We offered to pare the skin lesion, but the patient declined and preferred conservative management with topicals only as below.  - Perform daily foot soaks, use corn razor, and apply urea 40% cream daily and cover with Saran wrap  - Urea 40 % CREA; Externally apply topically daily To the corn on the toe as needed for up to 6-12 weeks  Dispense: 120 g; Refill: 4    3. Inflamed acrochordon  - We treated the inflamed acrochordon at the left superior buttock with LN2 today. See procedure note below.  - DESTRUCT BENIGN LESION, UP TO 14      Procedures Performed:   - Cryotherapy procedure note, location(s): left superior buttock. After verbal consent and discussion of risks and benefits including, but not limited to, dyspigmentation/scar, blister, and pain, 1 skin lesion(s) was(were) treated with 1-2 mm freeze border for 1-2 cycles with liquid nitrogen. Post cryotherapy instructions were provided.    Follow-up: 1 year(s) in-person, or earlier for new or changing lesions    Staff and Resident:     Dr. Roland Thayer (resident)  Dr. Otf West (staff)  ____________________________________________    CC: Derm Problem (Bubba has a growth on his toe. He states \" I have had the growth x2 " "months.\")    HPI:  Mr. Bubba Branch is a(n) 60 year old male who presents today as a return patient for multiple skin problems.    He says that he over the last 3-4 months has had a corn arise at the right third toe. He denies wearing tight fitting shoes, but says that he has another corn on this foot as well. He has over the last 10 days been using Dr. Crawford's corn remover, which he says has led to some slight pain at the area now. He denies skin lesions on the opposite foot. Otherwise, he says he would like a refill of BPO wash for acne vulgaris. He also states for the last several months, he has noticed a fleshy bump at the left superior buttock that becomes irritated by rubbing against the waist line of his trousers. He would like it removed, if possible, due to these symptoms.    Patient is otherwise feeling well, without additional skin concerns.    Labs Reviewed:  N/A    Physical Exam:  Vitals: There were no vitals taken for this visit.  SKIN: Focused examination of face, neck, trunk, buttocks, and right foot was performed.  - At the right third toe, there is a hyperkeratotic, plate-like nodule with a central plug.  - At the left superior buttock, there is a 2 mm fleshy, pink papule with surrounding erythema that appears irritated clinically.  - No acneiform lesions on the face today.  - No other lesions of concern on areas examined.     Medications:  Current Outpatient Medications   Medication     acyclovir (ZOVIRAX) 400 MG tablet     benzoyl peroxide 5 % external liquid     Multiple Vitamin (MULTIVITAMINS PO)     omeprazole (PRILOSEC) 40 MG capsule     Current Facility-Administered Medications   Medication     triamcinolone acetonide (KENALOG-10) injection 10 mg      Past Medical History:   Patient Active Problem List   Diagnosis     Chemical dependency (H)     CARDIOVASCULAR SCREENING; LDL GOAL LESS THAN 130     Pes planus     Mild major depression (H)     Shoulder pain     Facial bones, closed " fracture (H)     Past Medical History:   Diagnosis Date     Back injury 2008     Chemical dependency (H)      Depression      Head injury 2003, 2008       CC Referred Self, MD  No address on file on close of this encounter.

## 2021-04-08 NOTE — PATIENT INSTRUCTIONS
Today, we searched in [employer] the following:    PurOrganica Urea 40% Foot Cream - Best Callus Remover - Moisturizes & Rehydrates Thick, Cracked, Rough, Dead & Dry Skin - For Feet, Elbows and Hands + Pumice Stone - 4 oz      On [employer], we found the following:    TRIM Foot Care Callus & [place] Shaver with 2 Extra Corn Blades    Cryotherapy    What is it?    Use of a very cold liquid, such as liquid nitrogen, to freeze and destroy abnormal skin cells that need to be removed    What should I expect?    Tenderness and redness    A small blister that might grow and fill with dark purple blood. There may be crusting.    More than one treatment may be needed if the lesions do not go away.    How do I care for the treated area?    Gently wash the area with your hands when bathing.    Use a thin layer of Vaseline to help with healing. You may use a Band-Aid.     The area should heal within 7-10 days and may leave behind a pink or lighter color.     Do not use an antibiotic or Neosporin ointment.     You may take acetaminophen (Tylenol) for pain.     Call your Doctor if you have:    Severe pain    Signs of infection (warmth, redness, cloudy yellow drainage, and or a bad smell)    Questions or concerns    Who should I call with questions?       [facility]: [phone]       [facility]: [phone]       For urgent needs outside of business hours call the [facility] at [phone]        and ask for the dermatology resident on call

## 2021-04-09 ENCOUNTER — IMMUNIZATION (OUTPATIENT)
Dept: NURSING | Facility: CLINIC | Age: 61
End: 2021-04-09
Payer: COMMERCIAL

## 2021-04-09 PROCEDURE — 91300 PR COVID VAC PFIZER DIL RECON 30 MCG/0.3 ML IM: CPT

## 2021-04-09 PROCEDURE — 0001A PR COVID VAC PFIZER DIL RECON 30 MCG/0.3 ML IM: CPT

## 2021-04-16 ENCOUNTER — OFFICE VISIT (OUTPATIENT)
Dept: DERMATOLOGY | Facility: CLINIC | Age: 61
End: 2021-04-16
Payer: COMMERCIAL

## 2021-04-16 DIAGNOSIS — L91.0 HYPERTROPHIC SCAR: Primary | ICD-10-CM

## 2021-04-16 PROCEDURE — 11900 INJECT SKIN LESIONS </W 7: CPT | Mod: GC | Performed by: DERMATOLOGY

## 2021-04-16 ASSESSMENT — PAIN SCALES - GENERAL: PAINLEVEL: NO PAIN (0)

## 2021-04-16 NOTE — PROGRESS NOTES
Drug Administration Record    Prior to injection, verified patient identity using patient's name and date of birth.  Due to injection administration, patient instructed to remain in clinic for 15 minutes  afterwards, and to report any adverse reaction to me immediately.    Drug Name: triamcinolone acetonide(kenalog)  Dose: 1mL of triamcinolone 10mg/mL, 10mg dose  Route administered: ID  NDC #: Kenalog-10 (3246-7206-71)  Amount of waste(mL):4mL  Reason for waste: Single use vial    LOT #: [id number]  SITE: see note  : [employer]  EXPIRATION DATE: 8/2022

## 2021-04-16 NOTE — NURSING NOTE
Dermatology Rooming Note    Bubab Branch's goals for this visit include:   Chief Complaint   Patient presents with     Derm Problem     Brennan is here today for a core laser treatment. He would like to discuss getting a hormone injection today.     GERRI Anderson

## 2021-04-16 NOTE — PROGRESS NOTES
Dermatology Laser Intake Checklist:  History of psoriasis:No  History of recent tan, indoor or outdoor tanning/vacation or other sun exposure: No  History of vitiligo:No  Family history of vitiligo:No  Recent other cosmetic procedure(microderm abrasion/peel/hair removal/facial etc):No  History of HSV:No   Did the patient start valtrex: No  For genital laser hair removal patient only: Is there a history of genital warts or condyloma:No  Tattoo in the area to be treated:No  Is patient using hydroquinone:No  Retinoids and other acne medications stopped for 2 weeks:Yes  Has the patient had accutane in the last 6-12 months:No  Pregnant or breastfeeding: No  History of skin cancer in area planned for treatment: No  History of treatment with gold:No  Changes in medical history: No  Photos obtained: Yes  Does the patient smoke:No  Is the patient on ibuprofen/aspirin/plavix/coumadin/other blood thinner: No  If patient is taking narcotic or diazepam(valium)-does patient have : No  There were no vitals taken for this visit.  GERRI Anderson

## 2021-04-16 NOTE — LETTER
4/16/2021       RE: Bubba Branch  [address] [facility] [address]     Dear Colleague,    Thank you for referring your patient, Bubba Branch, to the [facility] DERMATOLOGY CLINIC [place] at [facility]. Please see a copy of my visit note below.    Dermatology Laser Intake Checklist:  History of psoriasis:No  History of recent tan, indoor or outdoor tanning/vacation or other sun exposure: No  History of vitiligo:No  Family history of vitiligo:No  Recent other cosmetic procedure(microderm abrasion/peel/hair removal/facial etc):No  History of HSV:No   Did the patient start valtrex: No  For genital laser hair removal patient only: Is there a history of genital warts or condyloma:No  Tattoo in the area to be treated:No  Is patient using hydroquinone:No  Retinoids and other acne medications stopped for 2 weeks:Yes  Has the patient had accutane in the last 6-12 months:No  Pregnant or breastfeeding: No  History of skin cancer in area planned for treatment: No  History of treatment with gold:No  Changes in medical history: No  Photos obtained: Yes  Does the patient smoke:No  Is the patient on ibuprofen/aspirin/plavix/coumadin/other blood thinner: No  If patient is taking narcotic or diazepam(valium)-does patient have : No  There were no vitals taken for this visit.  GERRI Anderson    [facility] Dermatology Note  Encounter Date: Apr 16, 2021  Office Visit     Dermatology Problem List:  1. Acne vulgaris and sebaceous hyperplasia  - tretinoin 0.025% cream QHS (adapalene if not covered by insurance)  - BP 5% wash, clindamycin 1% lotion in the am   2. Atrophic scars, R temporal scalp  - s/p CO2RE laser therapy per Dr. Hudson   - s/p ILK 10 mg/mL 4/16/21  3. Lipoma, left medial forearm, status-post excision on 1/2/2020  4. [place] of the right third toe: urea 40% cream, daily foot soaks, and corn razor  daily    ____________________________________________    Assessment & Plan:     # Hypertrophic scar of the right frontoparietal scalp  - IL-K injections performed today (see procedure note(s) below).  - Discussed that at today's visit, it may be too early to perform another CO2 laser treatment as he recently had this done with Dr. Hudson 4 weeks ago on 3/17/21. Instead, we opted to treat the hypertrophic scar with ILK today, which he reports he has not had done thus far. Risks including hypopigmentation, skin atrophy, ulceration, pain, bleeding or bruising were discussed.   - Photodocumentation obtained prior to procedure    Procedures Performed:   - Intra-lesional triamcinolone procedure note. After positioning and cleansing with isopropyl alcohol, 0.1 total mL of triamcinolone 10 mg/mL was injected into 1 lesion(s) on the right frontoparietal scalp. The patient tolerated the procedure well and left the dermatology clinic in good condition.    Follow-up as scheduled on 6/12/21 with Dr. Hudson    Staff and Resident:     Elizabeth Ocasio DO (PGY-2)  Dermatology Resident  [facility]    Staff: Dr. Mckeon    Staff Physician Comments:   I saw and evaluated the patient with the resident and I agree with the assessment and plan. I was present for the entire minor procedure and examination. I did ILK myself.    Linda Mckeon MD    Department of Dermatology  [facility]: Phone: [phone], Fax:[phone]  [facility]: Phone: [phone], Fax: [phone]    ____________________________________________    CC: Derm Problem (Brennan is here today for a core laser treatment. He would like to discuss getting a triamcinolone injection today.)    HPI:  Mr. Bubba Branch is a(n) 60 year old male who presents today for follow-up  for hypertrophic scar of the scalp. He recently had his  fourth CO2RE laser procedure performed on the scalp with  on 3/17/21 and is quite satisfied with the results thus far and continues to see improvement. He returns today to discuss ILK of the raised scar on the right scalp.     Patient is otherwise feeling well, without additional skin concerns.    Labs Reviewed:  N/A    Physical Exam:  Vitals: There were no vitals taken for this visit.  SKIN: Focused examination of scalp was performed.  - Linear skin colored hypertrophic plaque on the right frontoparietal scalp. (Photos obtained)  - Hypopigmented linear atrophic plaque on the frontal/top of scalp.   - No other lesions of concern on areas examined.     Medications:  Current Outpatient Medications   Medication     acyclovir (ZOVIRAX) 400 MG tablet     benzoyl peroxide 5 % external liquid     Multiple Vitamin (MULTIVITAMINS PO)     omeprazole (PRILOSEC) 40 MG capsule     Urea 40 % CREA     Current Facility-Administered Medications   Medication     triamcinolone acetonide (KENALOG-10) injection 10 mg      Past Medical History:   Patient Active Problem List   Diagnosis     Chemical dependency (H)     CARDIOVASCULAR SCREENING; LDL GOAL LESS THAN 130     Pes planus     Mild major depression (H)     Shoulder pain     Facial bones, closed fracture (H)     Past Medical History:   Diagnosis Date     Back injury 2008     Chemical dependency (H)      Depression      Head injury 2003, 2008       CC Referred Self, MD  No address on file on close of this encounter.    Drug Administration Record    Prior to injection, verified patient identity using patient's name and date of birth.  Due to injection administration, patient instructed to remain in clinic for 15 minutes  afterwards, and to report any adverse reaction to me immediately.    Drug Name: triamcinolone acetonide(kenalog)  Dose: 1mL of triamcinolone 10mg/mL, 10mg dose  Route administered: ID  NDC #: Kenalog-10 (1664-1831-99)  Amount of waste(mL):4mL  Reason for  waste: Single use vial    LOT #: [id number]  SITE: see note  : [employer]  EXPIRATION DATE: 8/2022

## 2021-04-16 NOTE — PROGRESS NOTES
[facility] Dermatology Note  Encounter Date: Apr 16, 2021  Office Visit     Dermatology Problem List:  1. Acne vulgaris and sebaceous hyperplasia  - tretinoin 0.025% cream QHS (adapalene if not covered by insurance)  - BP 5% wash, clindamycin 1% lotion in the am   2. Atrophic scars, R temporal scalp  - s/p CO2RE laser therapy per Dr. Hudson   - s/p ILK 10 mg/mL 4/16/21  3. Lipoma, left medial forearm, status-post excision on 1/2/2020  4. [place] of the right third toe: urea 40% cream, daily foot soaks, and corn razor daily    ____________________________________________    Assessment & Plan:     # Hypertrophic scar of the right frontoparietal scalp  - IL-K injections performed today (see procedure note(s) below).  - Discussed that at today's visit, it may be too early to perform another CO2 laser treatment as he recently had this done with Dr. Hudson 4 weeks ago on 3/17/21. Instead, we opted to treat the hypertrophic scar with ILK today, which he reports he has not had done thus far. Risks including hypopigmentation, skin atrophy, ulceration, pain, bleeding or bruising were discussed.   - Photodocumentation obtained prior to procedure    Procedures Performed:   - Intra-lesional triamcinolone procedure note. After positioning and cleansing with isopropyl alcohol, 0.1 total mL of triamcinolone 10 mg/mL was injected into 1 lesion(s) on the right frontoparietal scalp. The patient tolerated the procedure well and left the dermatology clinic in good condition.    Follow-up as scheduled on 6/12/21 with Dr. Hudson    Staff and Resident:     Elizabeth Ocasio DO (PGY-2)  Dermatology Resident  [facility]    Staff: Dr. Mckeon    Staff Physician Comments:   I saw and evaluated the patient with the resident and I agree with the assessment and plan. I was present for the entire minor procedure and examination. I did ILK myself.    Linda Mckeon MD    Department of  Dermatology  [facility]: Phone: [phone], Fax:[phone]  [facility]: Phone: [phone], Fax: [phone]    ____________________________________________    CC: Derm Problem (Brennan is here today for a core laser treatment. He would like to discuss getting a triamcinolone injection today.)    HPI:  Mr. Bubba Branch is a(n) 60 year old male who presents today for follow-up  for hypertrophic scar of the scalp. He recently had his fourth CO2RE laser procedure performed on the scalp with  on 3/17/21 and is quite satisfied with the results thus far and continues to see improvement. He returns today to discuss ILK of the raised scar on the right scalp.     Patient is otherwise feeling well, without additional skin concerns.    Labs Reviewed:  N/A    Physical Exam:  Vitals: There were no vitals taken for this visit.  SKIN: Focused examination of scalp was performed.  - Linear skin colored hypertrophic plaque on the right frontoparietal scalp. (Photos obtained)  - Hypopigmented linear atrophic plaque on the frontal/top of scalp.   - No other lesions of concern on areas examined.     Medications:  Current Outpatient Medications   Medication     acyclovir (ZOVIRAX) 400 MG tablet     benzoyl peroxide 5 % external liquid     Multiple Vitamin (MULTIVITAMINS PO)     omeprazole (PRILOSEC) 40 MG capsule     Urea 40 % CREA     Current Facility-Administered Medications   Medication     triamcinolone acetonide (KENALOG-10) injection 10 mg      Past Medical History:   Patient Active Problem List   Diagnosis     Chemical dependency (H)     CARDIOVASCULAR SCREENING; LDL GOAL LESS THAN 130     Pes planus     Mild major depression (H)     Shoulder pain     Facial bones, closed fracture (H)     Past Medical History:   Diagnosis Date     Back injury 2008     Chemical dependency (H)      Depression      Head injury  2003, 2008       CC Referred Self, MD  No address on file on close of this encounter.

## 2021-04-27 NOTE — PROGRESS NOTES
I talked with and examined Bubba Branch and I agree with the assessment and the plan. I was present for the entire procedure. MIKA West MD.

## 2021-04-30 ENCOUNTER — IMMUNIZATION (OUTPATIENT)
Dept: NURSING | Facility: CLINIC | Age: 61
End: 2021-04-30
Payer: COMMERCIAL

## 2021-04-30 PROCEDURE — 91300 PR COVID VAC PFIZER DIL RECON 30 MCG/0.3 ML IM: CPT

## 2021-04-30 PROCEDURE — 0002A PR COVID VAC PFIZER DIL RECON 30 MCG/0.3 ML IM: CPT

## 2021-06-16 ENCOUNTER — OFFICE VISIT (OUTPATIENT)
Dept: DERMATOLOGY | Facility: CLINIC | Age: 61
End: 2021-06-16
Payer: COMMERCIAL

## 2021-06-16 DIAGNOSIS — L90.5 SCAR: ICD-10-CM

## 2021-06-16 DIAGNOSIS — L81.4 LENTIGINES: Primary | ICD-10-CM

## 2021-06-16 PROCEDURE — 99213 OFFICE O/P EST LOW 20 MIN: CPT | Performed by: DERMATOLOGY

## 2021-06-16 RX ORDER — HYDROQUINONE 40 MG/G
CREAM TOPICAL
Qty: 30 G | Refills: 1 | Status: SHIPPED | OUTPATIENT
Start: 2021-06-16 | End: 2022-01-03

## 2021-06-16 ASSESSMENT — PAIN SCALES - GENERAL: PAINLEVEL: NO PAIN (0)

## 2021-06-16 NOTE — LETTER
6/16/2021       RE: Bubba Branch  [address] [facility] [address]     Dear Colleague,    Thank you for referring your patient, Bubba Branch, to the [facility] DERMATOLOGY CLINIC [employer] at [facility]. Please see a copy of my visit note below.    [facility] Dermatology Note  Encounter Date: Jun 16, 2021  Office Visit     Dermatology Problem List:  CO2, scars scalp improving    ____________________________________________    Assessment & Plan:     # Scars, scalp improved but tan today with solar lentigines  -hydroquinone 8 weeks prior to laser  -sun protection  -schedule CO2    Procedures Performed:   NA       Staff:     Reba Hudson MD    Department of Dermatology  [facility]: Phone: [phone], Fax:[phone]  [facility]: Phone: [phone], Fax: [phone]      ____________________________________________    CC: Laser Treatment (Bubba is here today for a CO2 treatment on a scar on his scalp. )    HPI:  Mr. Bubba Branch is a(n) 60 year old male who presents today as a return patient for scars on the scalp. Feels he is improved.     Patient is otherwise feeling well, without additional skin concerns.    Labs Reviewed:  CHERI    Physical Exam:  Vitals: There were no vitals taken for this visit.  SKIN:  Scars scalp improved, hypopigmentation with tan macules scattered on scalp  - No other lesions of concern on areas examined.     Medications:  Current Outpatient Medications   Medication     acyclovir (ZOVIRAX) 400 MG tablet     benzoyl peroxide 5 % external liquid     Multiple Vitamin (MULTIVITAMINS PO)     omeprazole (PRILOSEC) 40 MG capsule     Urea 40 % CREA     Current Facility-Administered Medications   Medication     triamcinolone acetonide (KENALOG-10) injection 10 mg      Past  Medical History:   Patient Active Problem List   Diagnosis     Chemical dependency (H)     CARDIOVASCULAR SCREENING; LDL GOAL LESS THAN 130     Pes planus     Mild major depression (H)     Shoulder pain     Facial bones, closed fracture (H)     Past Medical History:   Diagnosis Date     Back injury 2008     Chemical dependency (H)      Depression      Head injury 2003, 2008       CC Referred Self, MD  No address on file on close of this encounter.

## 2021-06-16 NOTE — PATIENT INSTRUCTIONS
[facility] Dermatology Visit    Thank you for allowing us to participate in your care. Your findings, instructions and follow-up plan are as follows:         When should I call my doctor?    If you are worsening or not improving, please, contact us or seek urgent care as noted below.     Who should I call with questions (adults)?    [facility] (adult and pediatric): [phone]     [facility] (adult): [phone]    For urgent needs outside of business hours call the [facility] at [phone] and ask for the dermatology resident on call    If this is a medical emergency and you are unable to reach an ER, Call 911      Who should I call with questions (pediatric)?  [facility]- Pediatric Dermatology  Dr. Julia Rivas, Dr. Pushpa Castillo, Dr. Genoveva Wilcox, Divya Coleman, PA  Dr. Sandrita Renae, Dr. Mirella Geiger & Dr. Stalin Griggs  Non Urgent  Nurse Triage Line; [phone]- Kay and Saima RN Care Coordinators   Lucia (/Complex ) [phone]    If you need a prescription refill, please contact your pharmacy. Refills are approved or denied by our Physicians during normal business hours, Monday through Fridays  Per office policy, refills will not be granted if you have not been seen within the past year (or sooner depending on your child's condition)    Scheduling Information:  Pediatric Appointment Scheduling and Call Center [phone]  Radiology Scheduling- [phone]  Sedation Unit Scheduling- [phone]  [place] Scheduling- General [phone]; Pediatric Dermatology [phone]  Main  Services: [phone]  Tajik: [phone]  Barbadian: [phone]  Hmong/Ukrainian/Slovenian: [phone]  Preadmission Nursing Department Fax Number: [phone] (Fax all pre-operative paperwork to this number)    For urgent matters arising during evenings,  weekends, or holidays that cannot wait for normal business hours please call [phone] and ask for the Dermatology Resident On-Call to be paged.

## 2021-06-16 NOTE — NURSING NOTE
Dermatology Rooming Note    Bubba Branch's goals for this visit include:   Chief Complaint   Patient presents with     Laser Treatment     Bubba is here today for a CO2 treatment on a scar on his scalp.      GERRI Honeycutt

## 2021-06-16 NOTE — PROGRESS NOTES
[facility] Dermatology Note  Encounter Date: Jun 16, 2021  Office Visit     Dermatology Problem List:  CO2, scars scalp improving    ____________________________________________    Assessment & Plan:     # Scars, scalp improved but tan today with solar lentigines  -hydroquinone 8 weeks prior to laser  -sun protection  -schedule CO2    Procedures Performed:   CHERI       Staff:     Reba Hudson MD    Department of Dermatology  [facility]: Phone: [phone], Fax:[phone]  [facility]: Phone: [phone], Fax: [phone]      ____________________________________________    CC: Laser Treatment (Bubba is here today for a CO2 treatment on a scar on his scalp. )    HPI:  Mr. Bubba Branch is a(n) 60 year old male who presents today as a return patient for scars on the scalp. Feels he is improved.     Patient is otherwise feeling well, without additional skin concerns.    Labs Reviewed:  CHERI    Physical Exam:  Vitals: There were no vitals taken for this visit.  SKIN:  Scars scalp improved, hypopigmentation with tan macules scattered on scalp  - No other lesions of concern on areas examined.     Medications:  Current Outpatient Medications   Medication     acyclovir (ZOVIRAX) 400 MG tablet     benzoyl peroxide 5 % external liquid     Multiple Vitamin (MULTIVITAMINS PO)     omeprazole (PRILOSEC) 40 MG capsule     Urea 40 % CREA     Current Facility-Administered Medications   Medication     triamcinolone acetonide (KENALOG-10) injection 10 mg      Past Medical History:   Patient Active Problem List   Diagnosis     Chemical dependency (H)     CARDIOVASCULAR SCREENING; LDL GOAL LESS THAN 130     Pes planus     Mild major depression (H)     Shoulder pain     Facial bones, closed fracture (H)     Past Medical History:   Diagnosis Date     Back injury 2008     Chemical  dependency (H)      Depression      Head injury 2003, 2008       CC Referred Self, MD  No address on file on close of this encounter.

## 2021-09-29 ENCOUNTER — OFFICE VISIT (OUTPATIENT)
Dept: DERMATOLOGY | Facility: CLINIC | Age: 61
End: 2021-09-29
Payer: COMMERCIAL

## 2021-09-29 DIAGNOSIS — L90.5 SCAR: Primary | ICD-10-CM

## 2021-09-29 PROCEDURE — 96999 UNLISTED SPEC DERM SVC/PX: CPT | Performed by: DERMATOLOGY

## 2021-09-29 ASSESSMENT — PAIN SCALES - GENERAL: PAINLEVEL: NO PAIN (0)

## 2021-09-29 NOTE — PATIENT INSTRUCTIONS
CO2RE Laser    I will experience redness, swelling, peeling, pain, and heat sensation. I may experience bleeding, oozing, itching, or acne. Risks are blistering, permanent scarring, temporary or permanent skin lightening or darkening, infection, and eye injury. I understand my outcome could be no improvement, slight improvement. Multiple treatments may be required.    AFTER CARE INSTRUCTIONS    - Avoid the sun    - You may start wound care after 24 hours or sooner if comfortable.    - The first 3 days, start soaks, twice daily, soak for a few minutes with a clean cloth saturated with a dilute vinegar solution to help prevent infection.    Mix 2 tablespoons of household white vinegar in 2 cups of water.    Prepare fresh each day     Apply  Aquaphor to protect and soften the peeling skin.   - After 3 days, cleanse the face water with water and a mild/gentle cleanser (i.e. Vanicream facial cleanser or Cetaphil facial cleanserproducts) once daily. Then, apply a thin layer of Cetaphil cream several times a day for healing and comfort.Once the skin sloughing and flaking is over, ointment (Aquaphor and epidermal repair cream) is no longer needed  - One sloughing and flaking is over sunblock  must be applied. Resume routine skin care and use of cosmetics as tolerated   - In some cases use of a bleaching cream may be recommended to start as well.  - Men may begin using an electric razor if they prefer to shave after 1 week.   - Apply sunblock every day and reapply every 2 hours when outside. Sun exposure can cause dark brown discoloration.    Reapply several times per day every day regardless of weather or indoors.    Use sunblock for face, SPF at least 30, broad spectrum (UVA & UVB), a physical blocker with titanium dioxide or zinc oxide    Use diligently at least 3 months and avoid direct sun exposure.    Use a broad-rim hat if outdoors for a long time.      Who should I call with questions?       [facility]  [facility]: [phone]       [facility]: [phone]       For urgent needs outside of business hours call the [facility] at [phone]        and ask for the dermatology resident on call

## 2021-09-29 NOTE — LETTER
9/29/2021       RE: Bubba Branch  [address] [facility] [address]     Dear Colleague,    Thank you for referring your patient, Bubba Branch, to the [facility] DERMATOLOGY CLINIC [facility]. Please see a copy of my visit note below.    See procedure note.      Again, thank you for allowing me to participate in the care of your patient.      Sincerely,    Reba Hudson MD

## 2021-09-29 NOTE — PROCEDURES
CORE Procedure: Cosmetic    Procedure Date: 09/29/2021  Staff: Dr. Reba Hudson  Resident/Fellow: Una Quan MD PGY3    Procedure:   CO2RE, fractional CO2 ablative laser treatment # 5  Approximate length of treatment: 15 minutes    Anesthesia and Premedication:  Anesthesia (topical): Benzocaine 20%/Lidocaine 8%/Tetracaine 4% ointment    Device Settings:  Diagnosis:   Location: Scalp  Mode(class/deep/mid/etc): deep  Frational coverage(%): 4%  Core(mJ): 40.9    Fotofinder photos: No    Description of Procedure:   The nature and purpose of the procedure, associated risks, possible consequences, complications, and alternative methods of treatment were explained in detail including but not limited to redness, swelling, peeling of the skin, eye injury, infection, bleeding, oozing, heat sensation, itching or acne.  Possible outcomes were reviewed including the following: no improvement, slight improvement, skin lightening or darkening. The possibility of permanent scarring was reviewed. Discussion that multiple treatments may be required was completed.     Photo consent was obtained and reviewed, a time out was performed, and informed consent was obtained.  The area was cleansed with [employer]. Protective eyewear was worn by the patient and all personnel in the treatment room  The patient confirmed the site to be treated. The laser energy output was verified by meter reading. The [place]irm[place] cooler and smoke evacuator devices were operated coincident with the CO2RE laser. The areas were treated with CO2RE laser as described.   Cold compresses and Aquaphor was applied in a thin layer. The patient tolerated the procedure well and no complications were noted. Verbal and written aftercare instructions were provided. Post procedure care including use of mild, gentle cleansers and moisturizers for the first week following treatment was reviewed. The patient was recommend application of at least SPF 30 sunscreen daily  and  avoidance of direct sunlight up to 3 months post procedure. The patient was discharged from the dermatology clinic in good condition.    The patient will follow up in 1 day is declined. HE will contact us for issues and see us in 2 months.     Staff Involved:  Resident/Staff        Scribe Disclosure:  I, Enriqueta Mcnally, am serving as a scribe to document services personally performed by Reba Hudson MD based on data collection and the provider's statements to me.     Provider Disclosure:   The documentation recorded by the scribe accurately reflects the services I personally performed and the decisions made by me.    Reba Hudson MD    Department of Dermatology  [facility]: Phone: [phone], Fax:[phone]  [facility]: Phone: [phone], Fax: [phone]    Staff Physician Comments:   I saw and evaluated the patient with the resident and I agree with the assessment and plan.  I was present for the procedure. The resident participated with the patients verbal consent. We did not charge him for the second scar treated by the resident.     Reba Hudson MD    Department of Dermatology  [facility]: Phone: [phone], Fax:[phone]  [facility]: Phone: [phone], Fax: [phone]

## 2021-09-29 NOTE — NURSING NOTE
Dermatology Rooming Note    Bubba Branch's goals for this visit include:   Chief Complaint   Patient presents with     Laser Treatment     Bubba is here today for a CO2 treatment      GERRI Honeycutt

## 2021-10-03 NOTE — PROGRESS NOTES
[facility] Dermatology Note  Encounter Date: Oct 7, 2021  Office Visit     Dermatology Problem List:  1. Acne vulgaris and sebaceous hyperplasia  - tretinoin 0.025% cream QHS (adapalene if not covered by insurance)  - BP 5% wash, clindamycin 1% lotion in the am   2. Atrophic scars, R temporal scalp  - s/p CO2RE laser therapy per Dr. Hudson   - s/p ILK 10 mg/mL 4/16/21  3. Lipoma, left medial forearm, status-post excision on 1/2/2020  4. [place] of the right third toe: urea 40% cream, daily foot soaks, and corn razor daily    ____________________________________________    Assessment & Plan:   1. Acne vulgaris  Stable, refilled BP wash  - benzoyl peroxide 5 % external liquid; Use daily as directed  Dispense: 226 g; Refill: 11    2. Senile sebaceous gland hyperplasia  Recommend discussion with Cosmetic Dermatology for electrodessication    3. Inflamed acrochordon, right buttock  LN2 as below    Procedures Performed:   - Cryotherapy procedure note, location(s): right buttock. After verbal consent and discussion of risks and benefits including, but not limited to, dyspigmentation/scar, blister, and pain, 1 lesion(s) was(were) treated with 1-2 mm freeze border for 1-2 cycles with liquid nitrogen. Post cryotherapy instructions were provided.    Follow-up: prn for new or changing lesions    Staff and Resident:     Jett Wisdom MD  PGY-2 Dermatology  Pager: [phone]    I have personally examined this patient and agree with Dr. Wisdom's documentation and plan of care. I have reviewed and amended the resident's note above. The documentation accurately reflects my clinical observations, diagnoses, treatment and follow-up plans. I was present for key portions of the procedure.       Genoveva Wilcox MD  Dermatology Staff    ____________________________________________    CC: Skin Check (pt states he is here for a skin tag, spot on right face, med refill)    HPI:  Mr. Bubba Branch is a(n) 60 year old male who  presents today as a return patient for skin tag and lesion(s) of concern  - spots on face present for past year, not bleeding growing or tender  - needs refill on BP wash  - notes skin tag on right buttock that gets caught on clothing and irritated  - Patient is otherwise feeling well, without additional skin concerns.    Labs Reviewed:  N/A    Physical Exam:  Vitals: There were no vitals taken for this visit.  SKIN: Focused examination of face and buttock was performed.  - yellow papules some with central umbillication on face c/w sebaceous hyperplasia  - flesh colored pedunculated papule on right buttock  - No other lesions of concern on areas examined.     Medications:  Current Outpatient Medications   Medication     acyclovir (ZOVIRAX) 400 MG tablet     benzoyl peroxide 5 % external liquid     hydroquinone (JUANPABLO) 4 % external cream     Multiple Vitamin (MULTIVITAMINS PO)     omeprazole (PRILOSEC) 40 MG capsule     Urea 40 % CREA     Current Facility-Administered Medications   Medication     triamcinolone acetonide (KENALOG-10) injection 10 mg      Past Medical History:   Patient Active Problem List   Diagnosis     Chemical dependency (H)     CARDIOVASCULAR SCREENING; LDL GOAL LESS THAN 130     Pes planus     Mild major depression (H)     Shoulder pain     Facial bones, closed fracture (H)     Past Medical History:   Diagnosis Date     Back injury 2008     Chemical dependency (H)      Depression      Head injury 2003, 2008       CC Referred Self, MD  No address on file on close of this encounter.

## 2021-10-07 ENCOUNTER — OFFICE VISIT (OUTPATIENT)
Dept: DERMATOLOGY | Facility: CLINIC | Age: 61
End: 2021-10-07
Payer: COMMERCIAL

## 2021-10-07 DIAGNOSIS — L73.8 SENILE SEBACEOUS GLAND HYPERPLASIA: ICD-10-CM

## 2021-10-07 DIAGNOSIS — L91.8 INFLAMED ACROCHORDON: Primary | ICD-10-CM

## 2021-10-07 DIAGNOSIS — L70.0 ACNE VULGARIS: ICD-10-CM

## 2021-10-07 PROCEDURE — 17110 DESTRUCTION B9 LES UP TO 14: CPT | Mod: GC

## 2021-10-07 PROCEDURE — 99213 OFFICE O/P EST LOW 20 MIN: CPT | Mod: 25

## 2021-10-07 ASSESSMENT — PAIN SCALES - GENERAL: PAINLEVEL: NO PAIN (0)

## 2021-10-07 NOTE — LETTER
10/7/2021       RE: Bubba Branch  [address] [facility] [address]     Dear Colleague,    Thank you for referring your patient, Bubba Branch, to the [facility] DERMATOLOGY CLINIC [place] at [facility]. Please see a copy of my visit note below.    [facility] Dermatology Note  Encounter Date: Oct 7, 2021  Office Visit     Dermatology Problem List:  1. Acne vulgaris and sebaceous hyperplasia  - tretinoin 0.025% cream QHS (adapalene if not covered by insurance)  - BP 5% wash, clindamycin 1% lotion in the am   2. Atrophic scars, R temporal scalp  - s/p CO2RE laser therapy per Dr. Hudson   - s/p ILK 10 mg/mL 4/16/21  3. Lipoma, left medial forearm, status-post excision on 1/2/2020  4. [place] of the right third toe: urea 40% cream, daily foot soaks, and corn razor daily    ____________________________________________    Assessment & Plan:   1. Acne vulgaris  Stable, refilled BP wash  - benzoyl peroxide 5 % external liquid; Use daily as directed  Dispense: 226 g; Refill: 11    2. Senile sebaceous gland hyperplasia  Recommend discussion with Cosmetic Dermatology for electrodessication    3. Inflamed acrochordon, right buttock  LN2 as below    Procedures Performed:   - Cryotherapy procedure note, location(s): right buttock. After verbal consent and discussion of risks and benefits including, but not limited to, dyspigmentation/scar, blister, and pain, 1 lesion(s) was(were) treated with 1-2 mm freeze border for 1-2 cycles with liquid nitrogen. Post cryotherapy instructions were provided.    Follow-up: prn for new or changing lesions    Staff and Resident:     Jett Wisdom MD  PGY-2 Dermatology  Pager: [phone]    I have personally examined this patient and agree with Dr. Wisdom's documentation and plan of care. I have reviewed and amended the resident's note above. The documentation accurately reflects my clinical observations,  diagnoses, treatment and follow-up plans. I was present for key portions of the procedure.       Genoveva Wilcox MD  Dermatology Staff    ____________________________________________    CC: Skin Check (pt states he is here for a skin tag, spot on right face, med refill)    HPI:  Mr. Bubba Branch is a(n) 60 year old male who presents today as a return patient for skin tag and lesion(s) of concern  - spots on face present for past year, not bleeding growing or tender  - needs refill on BP wash  - notes skin tag on right buttock that gets caught on clothing and irritated  - Patient is otherwise feeling well, without additional skin concerns.    Labs Reviewed:  N/A    Physical Exam:  Vitals: There were no vitals taken for this visit.  SKIN: Focused examination of face and buttock was performed.  - yellow papules some with central umbillication on face c/w sebaceous hyperplasia  - flesh colored pedunculated papule on right buttock  - No other lesions of concern on areas examined.     Medications:  Current Outpatient Medications   Medication     acyclovir (ZOVIRAX) 400 MG tablet     benzoyl peroxide 5 % external liquid     hydroquinone (JUANPABLO) 4 % external cream     Multiple Vitamin (MULTIVITAMINS PO)     omeprazole (PRILOSEC) 40 MG capsule     Urea 40 % CREA     Current Facility-Administered Medications   Medication     triamcinolone acetonide (KENALOG-10) injection 10 mg      Past Medical History:   Patient Active Problem List   Diagnosis     Chemical dependency (H)     CARDIOVASCULAR SCREENING; LDL GOAL LESS THAN 130     Pes planus     Mild major depression (H)     Shoulder pain     Facial bones, closed fracture (H)     Past Medical History:   Diagnosis Date     Back injury 2008     Chemical dependency (H)      Depression      Head injury 2003, 2008       CC Referred Self, MD  No address on file on close of this encounter.

## 2021-10-09 ENCOUNTER — HOSPITAL ENCOUNTER (EMERGENCY)
Facility: CLINIC | Age: 61
Discharge: HOME | End: 2021-10-09
Attending: EMERGENCY MEDICINE | Admitting: EMERGENCY MEDICINE
Payer: COMMERCIAL

## 2021-10-09 ENCOUNTER — APPOINTMENT (OUTPATIENT)
Dept: CT IMAGING | Facility: CLINIC | Age: 61
End: 2021-10-09
Attending: EMERGENCY MEDICINE
Payer: COMMERCIAL

## 2021-10-09 VITALS
OXYGEN SATURATION: 99 % | HEART RATE: 113 BPM | DIASTOLIC BLOOD PRESSURE: 85 MMHG | RESPIRATION RATE: 18 BRPM | TEMPERATURE: 98.8 F | SYSTOLIC BLOOD PRESSURE: 154 MMHG

## 2021-10-09 DIAGNOSIS — N20.1 URETEROLITHIASIS: ICD-10-CM

## 2021-10-09 LAB
ALBUMIN SERPL-MCNC: 4 G/DL (ref 3.4–5)
ALBUMIN UR-MCNC: 30 MG/DL
ALP SERPL-CCNC: 93 U/L (ref 40–150)
ALT SERPL W P-5'-P-CCNC: 23 U/L (ref 0–70)
ANION GAP SERPL CALCULATED.3IONS-SCNC: 5 MMOL/L (ref 3–14)
APPEARANCE UR: CLEAR
AST SERPL W P-5'-P-CCNC: 15 U/L (ref 0–45)
BASOPHILS # BLD AUTO: 0.1 10E3/UL (ref 0–0.2)
BASOPHILS NFR BLD AUTO: 0 %
BILIRUB SERPL-MCNC: 0.9 MG/DL (ref 0.2–1.3)
BILIRUB UR QL STRIP: NEGATIVE
BUN SERPL-MCNC: 16 MG/DL (ref 7–30)
CALCIUM SERPL-MCNC: 9.1 MG/DL (ref 8.5–10.1)
CHLORIDE BLD-SCNC: 105 MMOL/L (ref 94–109)
CO2 SERPL-SCNC: 29 MMOL/L (ref 20–32)
COLOR UR AUTO: YELLOW
CREAT SERPL-MCNC: 1.29 MG/DL (ref 0.66–1.25)
EOSINOPHIL # BLD AUTO: 0 10E3/UL (ref 0–0.7)
EOSINOPHIL NFR BLD AUTO: 0 %
ERYTHROCYTE [DISTWIDTH] IN BLOOD BY AUTOMATED COUNT: 12.8 % (ref 10–15)
GFR SERPL CREATININE-BSD FRML MDRD: 60 ML/MIN/1.73M2
GLUCOSE BLD-MCNC: 121 MG/DL (ref 70–99)
GLUCOSE UR STRIP-MCNC: NEGATIVE MG/DL
HCT VFR BLD AUTO: 49 % (ref 40–53)
HGB BLD-MCNC: 16.5 G/DL (ref 13.3–17.7)
HGB UR QL STRIP: ABNORMAL
HOLD SPECIMEN: NORMAL
IMM GRANULOCYTES # BLD: 0 10E3/UL
IMM GRANULOCYTES NFR BLD: 0 %
KETONES UR STRIP-MCNC: ABNORMAL MG/DL
LACTATE SERPL-SCNC: 0.9 MMOL/L (ref 0.7–2)
LEUKOCYTE ESTERASE UR QL STRIP: NEGATIVE
LIPASE SERPL-CCNC: 92 U/L (ref 73–393)
LYMPHOCYTES # BLD AUTO: 1.3 10E3/UL (ref 0.8–5.3)
LYMPHOCYTES NFR BLD AUTO: 9 %
MCH RBC QN AUTO: 31.1 PG (ref 26.5–33)
MCHC RBC AUTO-ENTMCNC: 33.7 G/DL (ref 31.5–36.5)
MCV RBC AUTO: 92 FL (ref 78–100)
MONOCYTES # BLD AUTO: 1.5 10E3/UL (ref 0–1.3)
MONOCYTES NFR BLD AUTO: 11 %
MUCOUS THREADS #/AREA URNS LPF: PRESENT /LPF
NEUTROPHILS # BLD AUTO: 11.2 10E3/UL (ref 1.6–8.3)
NEUTROPHILS NFR BLD AUTO: 80 %
NITRATE UR QL: NEGATIVE
NRBC # BLD AUTO: 0 10E3/UL
NRBC BLD AUTO-RTO: 0 /100
PH UR STRIP: 6.5 [PH] (ref 5–7)
PLATELET # BLD AUTO: 276 10E3/UL (ref 150–450)
POTASSIUM BLD-SCNC: 3.9 MMOL/L (ref 3.4–5.3)
PROT SERPL-MCNC: 7.6 G/DL (ref 6.8–8.8)
RBC # BLD AUTO: 5.31 10E6/UL (ref 4.4–5.9)
RBC URINE: 25 /HPF
SODIUM SERPL-SCNC: 139 MMOL/L (ref 133–144)
SP GR UR STRIP: 1.03 (ref 1–1.03)
UROBILINOGEN UR STRIP-MCNC: NORMAL MG/DL
WBC # BLD AUTO: 14.1 10E3/UL (ref 4–11)
WBC URINE: 2 /HPF

## 2021-10-09 PROCEDURE — 82040 ASSAY OF SERUM ALBUMIN: CPT | Performed by: EMERGENCY MEDICINE

## 2021-10-09 PROCEDURE — 74176 CT ABD & PELVIS W/O CONTRAST: CPT

## 2021-10-09 PROCEDURE — 85025 COMPLETE CBC W/AUTO DIFF WBC: CPT | Performed by: EMERGENCY MEDICINE

## 2021-10-09 PROCEDURE — 250N000013 HC RX MED GY IP 250 OP 250 PS 637: Performed by: EMERGENCY MEDICINE

## 2021-10-09 PROCEDURE — 99285 EMERGENCY DEPT VISIT HI MDM: CPT | Performed by: EMERGENCY MEDICINE

## 2021-10-09 PROCEDURE — 96375 TX/PRO/DX INJ NEW DRUG ADDON: CPT | Performed by: EMERGENCY MEDICINE

## 2021-10-09 PROCEDURE — 258N000003 HC RX IP 258 OP 636: Performed by: EMERGENCY MEDICINE

## 2021-10-09 PROCEDURE — 81001 URINALYSIS AUTO W/SCOPE: CPT | Performed by: EMERGENCY MEDICINE

## 2021-10-09 PROCEDURE — 99285 EMERGENCY DEPT VISIT HI MDM: CPT | Mod: 25 | Performed by: EMERGENCY MEDICINE

## 2021-10-09 PROCEDURE — 250N000011 HC RX IP 250 OP 636: Performed by: EMERGENCY MEDICINE

## 2021-10-09 PROCEDURE — 36415 COLL VENOUS BLD VENIPUNCTURE: CPT | Performed by: EMERGENCY MEDICINE

## 2021-10-09 PROCEDURE — 96361 HYDRATE IV INFUSION ADD-ON: CPT | Performed by: EMERGENCY MEDICINE

## 2021-10-09 PROCEDURE — 83605 ASSAY OF LACTIC ACID: CPT | Performed by: EMERGENCY MEDICINE

## 2021-10-09 PROCEDURE — 96374 THER/PROPH/DIAG INJ IV PUSH: CPT | Performed by: EMERGENCY MEDICINE

## 2021-10-09 PROCEDURE — 83690 ASSAY OF LIPASE: CPT | Performed by: EMERGENCY MEDICINE

## 2021-10-09 RX ORDER — ONDANSETRON 4 MG/1
4 TABLET, ORALLY DISINTEGRATING ORAL EVERY 8 HOURS PRN
Qty: 10 TABLET | Refills: 0 | Status: SHIPPED | OUTPATIENT
Start: 2021-10-09 | End: 2021-10-13

## 2021-10-09 RX ORDER — HYDROCODONE BITARTRATE AND ACETAMINOPHEN 5; 325 MG/1; MG/1
1 TABLET ORAL EVERY 6 HOURS PRN
Qty: 18 TABLET | Refills: 0 | Status: SHIPPED | OUTPATIENT
Start: 2021-10-09 | End: 2021-10-14

## 2021-10-09 RX ORDER — TAMSULOSIN HYDROCHLORIDE 0.4 MG/1
0.4 CAPSULE ORAL DAILY
Qty: 10 CAPSULE | Refills: 0 | Status: SHIPPED | OUTPATIENT
Start: 2021-10-09 | End: 2021-10-19

## 2021-10-09 RX ORDER — KETOROLAC TROMETHAMINE 30 MG/ML
30 INJECTION, SOLUTION INTRAMUSCULAR; INTRAVENOUS ONCE
Status: COMPLETED | OUTPATIENT
Start: 2021-10-09 | End: 2021-10-09

## 2021-10-09 RX ORDER — MORPHINE SULFATE 4 MG/ML
4 INJECTION, SOLUTION INTRAMUSCULAR; INTRAVENOUS ONCE
Status: COMPLETED | OUTPATIENT
Start: 2021-10-09 | End: 2021-10-09

## 2021-10-09 RX ORDER — ACETAMINOPHEN 325 MG/1
975 TABLET ORAL ONCE
Status: COMPLETED | OUTPATIENT
Start: 2021-10-09 | End: 2021-10-09

## 2021-10-09 RX ORDER — ONDANSETRON 2 MG/ML
4 INJECTION INTRAMUSCULAR; INTRAVENOUS
Status: COMPLETED | OUTPATIENT
Start: 2021-10-09 | End: 2021-10-09

## 2021-10-09 RX ORDER — IBUPROFEN 200 MG
400 TABLET ORAL EVERY 8 HOURS PRN
Qty: 30 TABLET | Refills: 0 | Status: SHIPPED | OUTPATIENT
Start: 2021-10-09 | End: 2022-02-15

## 2021-10-09 RX ADMIN — KETOROLAC TROMETHAMINE 30 MG: 30 INJECTION, SOLUTION INTRAMUSCULAR; INTRAVENOUS at 13:22

## 2021-10-09 RX ADMIN — ONDANSETRON 4 MG: 2 INJECTION INTRAMUSCULAR; INTRAVENOUS at 13:07

## 2021-10-09 RX ADMIN — MORPHINE SULFATE 4 MG: 4 INJECTION, SOLUTION INTRAMUSCULAR; INTRAVENOUS at 13:22

## 2021-10-09 RX ADMIN — ACETAMINOPHEN 975 MG: 325 TABLET, FILM COATED ORAL at 13:07

## 2021-10-09 RX ADMIN — SODIUM CHLORIDE 1000 ML: 9 INJECTION, SOLUTION INTRAVENOUS at 13:21

## 2021-10-09 ASSESSMENT — ENCOUNTER SYMPTOMS
NECK PAIN: 0
DIFFICULTY URINATING: 0
WEAKNESS: 0
DYSPHORIC MOOD: 0
SLEEP DISTURBANCE: 0
SORE THROAT: 0
BACK PAIN: 0
EYE REDNESS: 0
VOMITING: 1
FLANK PAIN: 1
FEVER: 0
HEADACHES: 0
NAUSEA: 1
ABDOMINAL PAIN: 0
COUGH: 0
SHORTNESS OF BREATH: 0

## 2021-10-09 NOTE — ED PROVIDER NOTES
ED Provider Note  [facility]      History     Chief Complaint   Patient presents with     Flank Pain     HPI  Bubba Branch is a 60 year old male who presents to the emergency department with left flank pain since last night.  He also complains of some testicular discomfort as well.  He has associated nausea and did vomit and have some dry heaves yesterday.  He denies any fever.  No constipation or diarrhea.  No hematuria, dysuria, urgency.  No chest pain or dyspnea.    Past Medical History  Past Medical History:   Diagnosis Date     Back injury 2008     Chemical dependency (H)      Depression      Head injury 2003, 2008     Past Surgical History:   Procedure Laterality Date     ENT SURGERY       ESOPHAGOSCOPY, GASTROSCOPY, DUODENOSCOPY (EGD), COMBINED N/A 6/29/2016    Procedure: COMBINED ESOPHAGOSCOPY, GASTROSCOPY, DUODENOSCOPY (EGD), BIOPSY SINGLE OR MULTIPLE;  Surgeon: Kim Cisneros MD;  Location: [facility]     HYDROcodone-acetaminophen (NORCO) 5-325 MG tablet  ibuprofen (ADVIL/MOTRIN) 200 MG tablet  ondansetron (ZOFRAN ODT) 4 MG ODT tab  tamsulosin (FLOMAX) 0.4 MG capsule  acyclovir (ZOVIRAX) 400 MG tablet  benzoyl peroxide 5 % external liquid  hydroquinone (JUANPABLO) 4 % external cream  Multiple Vitamin (MULTIVITAMINS PO)  omeprazole (PRILOSEC) 40 MG capsule  Urea 40 % CREA      Allergies   Allergen Reactions     Penicillins Unknown     Pt unsure of reaction, possible childhood rxn     Family History  Family History   Problem Relation Age of Onset     Diabetes Mother      Heart Disease Mother      Hypertension Father      Social History   Social History     Tobacco Use     Smoking status: Former Smoker     Years: 10.00     Smokeless tobacco: Never Used   Substance Use Topics     Alcohol use: Yes     Comment: 1 per day average     Drug use: No      Past medical history, past surgical history, medications, allergies, family history, and social history were reviewed with the patient. No  additional pertinent items.       Review of Systems   Constitutional: Negative for fever.   HENT: Negative for congestion and sore throat.    Eyes: Negative for redness.   Respiratory: Negative for cough and shortness of breath.    Cardiovascular: Negative for chest pain.   Gastrointestinal: Positive for nausea and vomiting. Negative for abdominal pain.   Genitourinary: Positive for flank pain. Negative for difficulty urinating.   Musculoskeletal: Negative for back pain and neck pain.   Skin: Negative for rash.   Neurological: Negative for weakness and headaches.   Psychiatric/Behavioral: Negative for dysphoric mood, sleep disturbance and suicidal ideas.   All other systems reviewed and are negative.    A complete review of systems was performed with pertinent positives and negatives noted in the HPI, and all other systems negative.    Physical Exam   BP: (!) 154/85  Pulse: 113  Temp: 98.8  F (37.1  C)  Resp: 18  SpO2: 99 %  Physical Exam  Vitals and nursing note reviewed.   Constitutional:       General: He is in acute distress.      Appearance: He is not diaphoretic.   HENT:      Head: Atraumatic.   Eyes:      General: No scleral icterus.     Pupils: Pupils are equal, round, and reactive to light.   Cardiovascular:      Rate and Rhythm: Normal rate and regular rhythm.      Pulses: Normal pulses.      Heart sounds: Normal heart sounds.   Pulmonary:      Effort: No respiratory distress.      Breath sounds: Normal breath sounds.   Abdominal:      General: Bowel sounds are normal.      Palpations: Abdomen is soft.      Tenderness: There is no abdominal tenderness.   Musculoskeletal:         General: No tenderness. Normal range of motion.   Skin:     General: Skin is warm.      Findings: No rash.   Neurological:      General: No focal deficit present.      Mental Status: He is alert.      Motor: No weakness.      Coordination: Coordination normal.         ED Course      Procedures       The medical record was reviewed  and interpreted.  Current labs reviewed and interpreted.  Previous labs reviewed and interpreted.  Current images reviewed and interpreted: Distal left ureteral stone.  Managed outpatient prescription medications.       Results for orders placed or performed during the hospital encounter of 10/09/21   CT Abdomen Pelvis w/o Contrast     Status: None    Narrative    EXAM: CT ABDOMEN PELVIS W/O CONTRAST  LOCATION: [facility]  DATE/TIME: 10/9/2021 3:12 PM    INDICATION: Flank pain, kidney stone suspected  COMPARISON: None.  TECHNIQUE: CT scan of the abdomen and pelvis was performed without IV contrast. Multiplanar reformats were obtained. Dose reduction techniques were used.  CONTRAST: None.    FINDINGS:   LOWER CHEST: Bibasilar atelectasis.    HEPATOBILIARY: Normal noncontrast appearance of the liver. No calcified gallstones.    PANCREAS: No acute peripancreatic inflammatory changes.    SPLEEN: Normal size.    ADRENAL GLANDS: Normal.    KIDNEYS/BLADDER: 3 mm calculus in the distal left ureter, just proximal to the ureterovesical junction. Mild left hydroureteronephrosis. There is left periureteral and perinephric edema.    5 mm nonobstructing right intrarenal calculus. No right hydronephrosis. Right renal cyst does not require further imaging evaluation. Normal noncontrast appearance of the bladder.    BOWEL: Small hiatal hernia. No small bowel obstruction. Scattered colonic diverticula without diverticulitis.    LYMPH NODES: No lymphadenopathy.    VASCULATURE: Normal caliber abdominal aorta with scattered atheromatous disease.    PELVIC ORGANS: Normal.    MUSCULOSKELETAL: No significant osseous abnormality.      Impression    IMPRESSION:   1.  3 mm distal left ureteral calculus with mild left hydroureteronephrosis.    2.  Nonobstructing 5 mm right intrarenal calculus.     UA with Microscopic reflex to Culture     Status: Abnormal    Specimen: Urine, Clean Catch   Result  Value Ref Range    Color Urine Yellow Colorless, Straw, Light Yellow, Yellow    Appearance Urine Clear Clear    Glucose Urine Negative Negative mg/dL    Bilirubin Urine Negative Negative    Ketones Urine Trace (A) Negative mg/dL    Specific Gravity Urine 1.028 1.003 - 1.035    Blood Urine Moderate (A) Negative    pH Urine 6.5 5.0 - 7.0    Protein Albumin Urine 30  (A) Negative mg/dL    Urobilinogen Urine Normal Normal, 2.0 mg/dL    Nitrite Urine Negative Negative    Leukocyte Esterase Urine Negative Negative    Mucus Urine Present (A) None Seen /LPF    RBC Urine 25 (H) <=2 /HPF    WBC Urine 2 <=5 /HPF    Narrative    Urine Culture not indicated   Extra Blue Top Tube     Status: None   Result Value Ref Range    Hold Specimen JIC    Extra Red Top Tube     Status: None   Result Value Ref Range    Hold Specimen JIC    Extra Green Top (Lithium Heparin) Tube     Status: None   Result Value Ref Range    Hold Specimen JIC    Extra Purple Top Tube     Status: None   Result Value Ref Range    Hold Specimen JIC    Comprehensive metabolic panel     Status: Abnormal   Result Value Ref Range    Sodium 139 133 - 144 mmol/L    Potassium 3.9 3.4 - 5.3 mmol/L    Chloride 105 94 - 109 mmol/L    Carbon Dioxide (CO2) 29 20 - 32 mmol/L    Anion Gap 5 3 - 14 mmol/L    Urea Nitrogen 16 7 - 30 mg/dL    Creatinine 1.29 (H) 0.66 - 1.25 mg/dL    Calcium 9.1 8.5 - 10.1 mg/dL    Glucose 121 (H) 70 - 99 mg/dL    Alkaline Phosphatase 93 40 - 150 U/L    AST 15 0 - 45 U/L    ALT 23 0 - 70 U/L    Protein Total 7.6 6.8 - 8.8 g/dL    Albumin 4.0 3.4 - 5.0 g/dL    Bilirubin Total 0.9 0.2 - 1.3 mg/dL    GFR Estimate 60 (L) >60 mL/min/1.73m2   Lipase     Status: Normal   Result Value Ref Range    Lipase 92 73 - 393 U/L   CBC with platelets and differential     Status: Abnormal   Result Value Ref Range    WBC Count 14.1 (H) 4.0 - 11.0 10e3/uL    RBC Count 5.31 4.40 - 5.90 10e6/uL    Hemoglobin 16.5 13.3 - 17.7 g/dL    Hematocrit 49.0 40.0 - 53.0 %    MCV  92 78 - 100 fL    MCH 31.1 26.5 - 33.0 pg    MCHC 33.7 31.5 - 36.5 g/dL    RDW 12.8 10.0 - 15.0 %    Platelet Count 276 150 - 450 10e3/uL    % Neutrophils 80 %    % Lymphocytes 9 %    % Monocytes 11 %    % Eosinophils 0 %    % Basophils 0 %    % Immature Granulocytes 0 %    NRBCs per 100 WBC 0 <1 /100    Absolute Neutrophils 11.2 (H) 1.6 - 8.3 10e3/uL    Absolute Lymphocytes 1.3 0.8 - 5.3 10e3/uL    Absolute Monocytes 1.5 (H) 0.0 - 1.3 10e3/uL    Absolute Eosinophils 0.0 0.0 - 0.7 10e3/uL    Absolute Basophils 0.1 0.0 - 0.2 10e3/uL    Absolute Immature Granulocytes 0.0 <=0.0 10e3/uL    Absolute NRBCs 0.0 10e3/uL   Lactic acid whole blood STAT     Status: Normal   Result Value Ref Range    Lactic Acid 0.9 0.7 - 2.0 mmol/L   Bulger Draw     Status: None    Narrative    The following orders were created for panel order Bulger Draw.  Procedure                               Abnormality         Status                     ---------                               -----------         ------                     Extra Blue Top Tube[[id number]]                              Final result               Extra Red Top Tube[[id number]]                               Final result               Extra Green Top (Lithium...[[id number]]                      Final result               Extra Purple Top Tube[[id number]]                            Final result                 Please view results for these tests on the individual orders.   CBC with platelets differential     Status: Abnormal    Narrative    The following orders were created for panel order CBC with platelets differential.  Procedure                               Abnormality         Status                     ---------                               -----------         ------                     CBC with platelets and d...[[id number]]  Abnormal            Final result                 Please view results for these tests on the individual orders.     Medications   acetaminophen  (TYLENOL) tablet 975 mg (975 mg Oral Given 10/9/21 1307)   ondansetron (ZOFRAN) injection 4 mg (4 mg Intravenous Given 10/9/21 1307)   0.9% sodium chloride BOLUS (0 mLs Intravenous Stopped 10/9/21 1417)   ketorolac (TORADOL) injection 30 mg (30 mg Intravenous Given 10/9/21 1322)   morphine (PF) injection 4 mg (4 mg Intravenous Given 10/9/21 1322)        Assessments & Plan (with Medical Decision Making)   60 year old male to the emergency department with left flank pain.  Differential diagnosis includes ureterolithiasis, aortic aneurysm, acute pancreatitis, bowel obstruction, bowel perforation, diverticulitis.  Will perform labs, urinalysis, and CT of abdomen/pelvis.    Patient has hematuria but no evidence for infection.  Does have a mild leukocytosis but no fever no evidence for infection on urinalysis.  Suspect demargination due to pain.  He does have a mild increase in his creatinine as well but suspect that is most likely related to dehydration.  He does not have bilateral hydronephrosis on CT imaging.  He does have a left-sided distal ureteral stone.  The remainder of his laboratory evaluation is unremarkable.  His symptoms are well controlled after the above therapies.  Expectant management appears appropriate at this time as he is stable and his symptoms are well controlled.  Despite the mild leukocytosis, he does not have any evidence for infectious process.  Options for symptom management were reviewed.  With his mild renal insufficiency, will minimize NSAID dosing.  He will also be prescribed Norco for breakthrough pain.  Ondansetron will be prescribed for nausea.  Flomax will also be prescribed.  He was provided a strainer and will strain his urine and take his stone to urology follow-up.  Urology referral made.  Return precautions were provided.  Importance of immediate return if he develops fever or uncontrolled symptoms emphasized.    I have reviewed the nursing notes. I have reviewed the findings,  diagnosis, plan and need for follow up with the patient.    Discharge Medication List as of 10/9/2021  4:35 PM      START taking these medications    Details   HYDROcodone-acetaminophen (NORCO) 5-325 MG tablet Take 1 tablet by mouth every 6 hours as needed for pain, Disp-18 tablet, R-0, E-Prescribe      ibuprofen (ADVIL/MOTRIN) 200 MG tablet Take 2 tablets (400 mg) by mouth every 8 hours as needed for moderate pain, Disp-30 tablet, R-0, E-Prescribe      ondansetron (ZOFRAN ODT) 4 MG ODT tab Take 1 tablet (4 mg) by mouth every 8 hours as needed for nausea or vomiting, Disp-10 tablet, R-0, E-Prescribe      tamsulosin (FLOMAX) 0.4 MG capsule Take 1 capsule (0.4 mg) by mouth daily for 10 doses, Disp-10 capsule, R-0, E-Prescribe             Final diagnoses:   Ureterolithiasis     Chart documentation was completed with Dragon voice-recognition software. Even though reviewed, this chart may still contain some grammatical, spelling, and word errors.     --  Caden Morrell Md  [facility] EMERGENCY DEPARTMENT  10/9/2021     Cdaen Morrell MD  10/10/21 1015

## 2021-10-09 NOTE — ED NOTES
Bed: ED18  Expected date:   Expected time:   Means of arrival:   Comments:  Hold for JAYCOB Branch in [facility]

## 2021-10-09 NOTE — ED TRIAGE NOTES
Pt complaining of sudden on of left flank pain a few hours ago and had the same thing a couple of days ago.  Pt concerned he has a kidney stone.

## 2021-10-09 NOTE — DISCHARGE INSTRUCTIONS
Take ibuprofen as needed for pain.  Take Norco as needed for pain not controlled by ibuprofen.  Do not drive for 8 hours after taking Norco.  Take ondansetron as needed for nausea.  Take Flomax as directed.  Strain urine, save stone, and take to follow-up urology appointment.    Please make an appointment to follow up with Urology Clinic (phone: [phone]).

## 2021-10-24 ENCOUNTER — HEALTH MAINTENANCE LETTER (OUTPATIENT)
Age: 61
End: 2021-10-24

## 2022-01-03 ENCOUNTER — HOSPITAL ENCOUNTER (EMERGENCY)
Facility: CLINIC | Age: 62
Discharge: HOME | End: 2022-01-03
Attending: EMERGENCY MEDICINE | Admitting: EMERGENCY MEDICINE
Payer: COMMERCIAL

## 2022-01-03 VITALS
SYSTOLIC BLOOD PRESSURE: 129 MMHG | WEIGHT: 174 LBS | DIASTOLIC BLOOD PRESSURE: 79 MMHG | TEMPERATURE: 98 F | BODY MASS INDEX: 25.68 KG/M2 | RESPIRATION RATE: 16 BRPM | OXYGEN SATURATION: 98 % | HEART RATE: 108 BPM

## 2022-01-03 DIAGNOSIS — L84 CALLUS OF FOOT: ICD-10-CM

## 2022-01-03 DIAGNOSIS — M79.674 PAIN OF TOE OF RIGHT FOOT: ICD-10-CM

## 2022-01-03 PROCEDURE — 99283 EMERGENCY DEPT VISIT LOW MDM: CPT | Performed by: EMERGENCY MEDICINE

## 2022-01-03 RX ORDER — BACITRACIN ZINC AND POLYMYXIN B SULFATE 500; 1000 [USP'U]/G; [USP'U]/G
OINTMENT TOPICAL 2 TIMES DAILY
Qty: 30 G | Refills: 0 | Status: SHIPPED | OUTPATIENT
Start: 2022-01-03 | End: 2023-04-02

## 2022-01-03 RX ORDER — ESCITALOPRAM OXALATE 10 MG/1
40 TABLET ORAL DAILY
COMMUNITY
Start: 2021-12-30

## 2022-01-03 NOTE — DISCHARGE INSTRUCTIONS
Instructions from your doctor today:  Emergency Department testing is focused on the potential causes of your symptoms that are the most dangerous possibilities, and cannot cover every possibility. Based on the evaluation, it was deemed sufficiently safe to discharge and continue management through the clinics. Thus, follow-up is very important to assess for improvement/worsening, potential further testing, and potential treatment adjustments. If you were given opioid pain medications or other medications that can make you drowsy while in the ED, you should not drive for at least several hours and not until you feel completely back to normal.     Please make an appointment to follow up with:  - Podiatry in 5-10 days (a referral has been placed and they should call you to schedule)  - If you do not have a primary care provider, you can be seen in follow-up and establish care by calling any of the clinics below:     - [facility] (phone: [phone])     - Primary Care / [facility] (phone: [phone])   - Have your clinic provider review the results from today's visit with you again, including any potential follow-up or additional testing that may be needed based on the results. Occasionally, incidental findings are found on later review by radiologists that may need follow-up.     Return to the Emergency Department immediately if you have increasing pain/redness/swelling/pus (signs of infection), or any other urgent or potentially life-threatening concerns.

## 2022-01-03 NOTE — ED TRIAGE NOTES
"Pt. had plantars wart on 3rd toe of right foot.  Has been treating with \"some ointment.\"  Now area is more discolored and painful to walk on.  "

## 2022-01-03 NOTE — ED PROVIDER NOTES
History     Chief Complaint   Patient presents with     Toe Pain     HPI  Bubba Branch is a 61 year old male who presents to the ED with right middle toe pain.  Patient reports pain started about a month ago, has been stable over that time.  Patient notes that in the months prior to that he had a wart on the toe, had been given some urea cream and a file by dermatologist that he saw.  He was using the cream and file on the area.  Eventually the the skin became very thick and hard on the lateral aspect of the toe and he began having some pain in the toe a month ago.  No new swelling, redness, discharge.  Patient denies other concerns.    I have reviewed the Past Medical History with the patient, pertinent items: Plantar wart    Review of Systems  No recent fevers, no chest pain, no abdominal pain    Physical Exam   BP: 129/79  Pulse: 108  Temp: 98  F (36.7  C)  Resp: 16  Weight: 78.9 kg (174 lb)  SpO2: 98 %    Physical Exam  General: No acute distress. Appears stated age.   HENT: MMM, no oropharyngeal lesions  Eyes: PERRL, normal sclerae   Cardio: Regular rate, extremities well perfused  Resp: Normal work of breathing, normal respiratory rate  Neuro: alert and fully oriented. CN II-XII grossly intact. Grossly normal strength and sensation in all extremities.   MSK: no deformities. Grossly normal ROM in extremities.  Right foot: Second toe with subungual hematoma which is nontender, third toe with flat plantar aspect callus with callus extension to the lateral aspect of the toe, mildly tender, not overly erythematous, and not overly warm, no discharge.  Integumentary/Skin: no rash, normal color  Psych: normal affect, normal behavior    ED Course      Procedures       Critical Care time:  none     Labs Ordered and Resulted from Time of ED Arrival to Time of ED Departure - No data to display  No orders to display          Assessments & Plan (with Medical Decision Making)   Patient presenting with right middle toe  pain for 1 month.  Exam notable for callus on the toe, no signs of infection such as felon nor paronychia. Vitals in the ED unremarkable. Nursing notes reviewed.     The complete clinical picture is most consistent with painful toe callus. After counseling on the diagnosis, work-up, and treatment plan, the patient was discharged.  Recommended daily application of topical antibiotic to prevent development of infection with the cracked callus.  The patient was advised to follow-up with podiatry in about a week. The patient was advised to return to the ED if worsening symptoms, signs of infection, or if there are any urgent/life-threatening concerns.     Final diagnoses:   Callus of foot   Pain of toe of right foot     Discharge Medication List as of 1/3/2022  4:36 AM      START taking these medications    Details   bacitracin-polymyxin b (POLYSPORIN) [id number] UNIT/GM external ointment Apply topically 2 times dailyDisp-30 g, [id number]             --  Unruly Cortez MD   Emergency Medicine   [facility] EMERGENCY DEPARTMENT  1/3/2022     Unruly Cortez MD  01/03/22 5116

## 2022-01-17 ENCOUNTER — OFFICE VISIT (OUTPATIENT)
Dept: FAMILY MEDICINE | Facility: CLINIC | Age: 62
End: 2022-01-17
Payer: COMMERCIAL

## 2022-01-17 VITALS
OXYGEN SATURATION: 98 % | DIASTOLIC BLOOD PRESSURE: 79 MMHG | WEIGHT: 174 LBS | HEART RATE: 106 BPM | SYSTOLIC BLOOD PRESSURE: 120 MMHG | BODY MASS INDEX: 25.68 KG/M2

## 2022-01-17 DIAGNOSIS — L84 CORN OR CALLUS: Primary | ICD-10-CM

## 2022-01-17 PROCEDURE — 99203 OFFICE O/P NEW LOW 30 MIN: CPT

## 2022-01-17 NOTE — PROGRESS NOTES
ASSESSMENT:    ICD-10-CM    1. Corn or callus  L84      Currently irritated with small blood blister formation underneath but no evidence of infection or circulation compromise at this time.    PLAN:  Pt already has referral to podiatry but has challenges related to housing and transportation at the moment and financial challenges that impact his ability to afford OTC items.  Today I used foam tape to build a makeshift corn pad around the irritated callus on the lateral aspect of the third toe.  Reinforced this with Coban and provided supplies for patient to recreate this bandaging at home.  He will continue to monitor the area for signs of infection or other concern and will return if these develop.  He will try to get to the podiatrist as soon as he's able.    SUBJECTIVE:  Bubba Branch is a 61 year old male who presents to  today with painful callus on the R 3rd toe.  No drainage.  Has had a corn on this toe for months and now a different area is becoming painful.      He is currently living in a men's shelter after being discharged from a CD treatment facility in mid-December.  He is feeling good about remaining sober.  He is staying connected with friends he made during this round of treatment and is planning to start going to support meetings soon.    OBJECTIVE:  /79 (BP Location: Right arm, Patient Position: Sitting, Cuff Size: Adult Regular)   Pulse 106   Wt 78.9 kg (174 lb)   SpO2 98%   BMI 25.68 kg/m    GEN: well-appearing, in NAD  EXT: R 3rd toe with thick callus at the tip of the toe on the plantar surface as well as a small corn with tiny underlying blood blister on the lateral aspect of this toe distal to the DIP joint.  No swelling or erythema to suggest local infection.  Calluses appear not to be cracked.  Good cap refill in all toes.  Subungual hematoma R 2nd toe appears old and stable.

## 2022-01-17 NOTE — PATIENT INSTRUCTIONS
Keep the pressure off that irritated callus area on the 3rd toe as much as you can.  The foam tape will help mimic a corn pad (which you can buy over-the-counter if you would like to).  The Coban wrap will help hold the foam tape in place and further reduce the rubbing that is irritating the area.    No signs of infection right now, but do keep your eyes out for lots of redness, new swelling, or red streaks coming up the foot.

## 2022-02-03 ENCOUNTER — LAB REQUISITION (OUTPATIENT)
Dept: LAB | Facility: CLINIC | Age: 62
End: 2022-02-03
Payer: COMMERCIAL

## 2022-02-03 DIAGNOSIS — F33.1 MAJOR DEPRESSIVE DISORDER, RECURRENT, MODERATE (H): ICD-10-CM

## 2022-02-03 DIAGNOSIS — E11.9 TYPE 2 DIABETES MELLITUS WITHOUT COMPLICATIONS (H): ICD-10-CM

## 2022-02-03 LAB
ALBUMIN SERPL-MCNC: 2.8 G/DL (ref 3.4–5)
ALP SERPL-CCNC: 43 U/L (ref 40–150)
ALT SERPL W P-5'-P-CCNC: 27 U/L (ref 0–70)
ANION GAP SERPL CALCULATED.3IONS-SCNC: 7 MMOL/L (ref 3–14)
AST SERPL W P-5'-P-CCNC: 22 U/L (ref 0–45)
BILIRUB SERPL-MCNC: 0.6 MG/DL (ref 0.2–1.3)
BUN SERPL-MCNC: 8 MG/DL (ref 7–30)
CALCIUM SERPL-MCNC: 8.2 MG/DL (ref 8.5–10.1)
CHLORIDE BLD-SCNC: 107 MMOL/L (ref 94–109)
CHOLEST SERPL-MCNC: 150 MG/DL
CO2 SERPL-SCNC: 24 MMOL/L (ref 20–32)
CREAT SERPL-MCNC: 0.8 MG/DL (ref 0.66–1.25)
FASTING STATUS PATIENT QL REPORTED: NO
GFR SERPL CREATININE-BSD FRML MDRD: >90 ML/MIN/1.73M2
GLUCOSE BLD-MCNC: 105 MG/DL (ref 70–99)
HDLC SERPL-MCNC: 52 MG/DL
LDLC SERPL CALC-MCNC: 88 MG/DL
NONHDLC SERPL-MCNC: 98 MG/DL
POTASSIUM BLD-SCNC: 3.6 MMOL/L (ref 3.4–5.3)
PROT SERPL-MCNC: 4.9 G/DL (ref 6.8–8.8)
SODIUM SERPL-SCNC: 138 MMOL/L (ref 133–144)
TRIGL SERPL-MCNC: 51 MG/DL
TSH SERPL DL<=0.005 MIU/L-ACNC: 1.7 MU/L (ref 0.4–4)
VIT B12 SERPL-MCNC: 439 PG/ML (ref 193–986)

## 2022-02-03 PROCEDURE — 82607 VITAMIN B-12: CPT | Mod: ORL | Performed by: INTERNAL MEDICINE

## 2022-02-03 PROCEDURE — 84443 ASSAY THYROID STIM HORMONE: CPT | Mod: ORL | Performed by: INTERNAL MEDICINE

## 2022-02-03 PROCEDURE — 80053 COMPREHEN METABOLIC PANEL: CPT | Mod: ORL | Performed by: INTERNAL MEDICINE

## 2022-02-03 PROCEDURE — 80061 LIPID PANEL: CPT | Mod: ORL | Performed by: INTERNAL MEDICINE

## 2022-02-13 ENCOUNTER — HEALTH MAINTENANCE LETTER (OUTPATIENT)
Age: 62
End: 2022-02-13

## 2022-02-15 ENCOUNTER — MYC MEDICAL ADVICE (OUTPATIENT)
Dept: DERMATOLOGY | Facility: CLINIC | Age: 62
End: 2022-02-15
Payer: COMMERCIAL

## 2022-02-15 ENCOUNTER — TELEPHONE (OUTPATIENT)
Dept: DERMATOLOGY | Facility: CLINIC | Age: 62
End: 2022-02-15
Payer: COMMERCIAL

## 2022-02-16 ENCOUNTER — OFFICE VISIT (OUTPATIENT)
Dept: DERMATOLOGY | Facility: CLINIC | Age: 62
End: 2022-02-16
Payer: COMMERCIAL

## 2022-02-16 DIAGNOSIS — L90.5 SCAR: Primary | ICD-10-CM

## 2022-02-16 PROCEDURE — 96999 UNLISTED SPEC DERM SVC/PX: CPT | Performed by: DERMATOLOGY

## 2022-02-16 NOTE — PROGRESS NOTES
CORE Procedure: Cosmetic    Interval History:   Patient is 97% satisfied   Patient requesting treatment only on the imid frontal scalp anddepression justt below that.    Interval Exam:  Linear scar on the mid frontal scalp, right forehead, and right lateral scalp.     Procedure Date: 02/16/2022  Staff: Dr. Reba Hudson  Medical student: Dante Mcgovern    Procedure:   CO2RE, fractional CO2 ablative laser treatment # 6  Approximate length of treatment: <10 minutes    Anesthesia and Premedication:  Anesthesia (topical): Benzocaine 20%/Lidocaine 8%/Tetracaine 4% ointment    Device Settings:  Diagnosis: Scar  Location: scalp  Mode(class/deep/mid/etc): deep  Frational coverage(%): 4%   Core(mJ): 50      Fotofinder photos: No    Description of Procedure:   The nature and purpose of the procedure, associated risks, possible consequences, complications, and alternative methods of treatment were explained in detail including but not limited to redness, swelling, peeling of the skin, eye injury, infection, bleeding, oozing, heat sensation, itching or acne.  Possible outcomes were reviewed including the following: no improvement, slight improvement, skin lightening or darkening. The possibility of permanent scarring was reviewed. Discussion that multiple treatments may be required was completed.     Photo consent was obtained and reviewed, a time out was performed, and informed consent was obtained.  The area was cleansed with [employer]. Protective eyewear was worn by the patient and all personnel in the treatment room  The patient confirmed the site to be treated. The laser energy output was verified by meter reading. The Nirmal cooler and smoke evacuator devices were operated coincident with the CO2RE laser. The areas were treated with CO2RE laser as described. The patient tolerated the procedure well and no complications were noted. Verbal and written aftercare instructions were provided. Post procedure care including use of  mild, gentle cleansers and moisturizers  for the first week following treatment was reviewed. The patient was recommend application of at least SPF 30 sunscreen daily  and avoidance of direct sunlight up to 6  months post procedure. The patient was discharged from the dermatology clinic in good condition.    The patient will follow up with nursing in 6 weeks. Patient declines 1 day follow-up.    Staff Involved:  Medical Student /Staff     Scribe Disclosure:  IEnriqueta, am serving as a scribe to document services personally performed by Reba Hudson MD based on data collection and the provider's statements to me.     I did the procedure(Dr. Hudson(  Provider Disclosure:   The documentation recorded by the scribe accurately reflects the services I personally performed and the decisions made by me.    Reba Hudson MD    Department of Dermatology  [facility]: Phone: [phone], Fax:[phone]  [facility]: Phone: [phone], Fax: [phone]

## 2022-02-16 NOTE — NURSING NOTE
Dermatology Rooming Note    Bubba M Sarina's goals for this visit include:   Chief Complaint   Patient presents with     Derm Problem     Bubba is here today for Co2 on scalp       Dermatology Laser Intake Checklist:  History of psoriasis: No  History of recent tan, indoor or outdoor tanning/vacation or other sun exposure: No  History of vitiligo: No  Family history of vitiligo: No  Recent other cosmetic procedure(microderm abrasion/peel/hair removal/facial etc): No  History of HSV: Yes  Did the patient start valtrex: No  For genital laser hair removal patient only: Is there a history of genital warts or condyloma: N/A  Tattoo in the area to be treated: No  Is patient using hydroquinone: No  Retinoids and other acne medications stopped for 2 weeks: N/A  Has the patient had accutane in the last 6-12 months: No  Pregnant or breastfeeding: N/A  History of skin cancer in area planned for treatment: No  History of treatment with gold: No  Changes in medical history: No  Photos obtained: Yes  Does the patient smoke: No  Is the patient on ibuprofen/aspirin/plavix/coumadin/other blood thinner: No  If patient is taking narcotic or diazepam(valium)-does patient have : N/A  There were no vitals taken for this visit.

## 2022-02-16 NOTE — LETTER
2/16/2022       RE: Bubba Branch  [address] [facility] [address]     Dear Colleague,    Thank you for referring your patient, Bubba Branch, to the [facility]MAT[facility] [place] at [facility]. Please see a copy of my visit note below.    CORE Procedure: Cosmetic    Interval History:   Patient is 97% satisfied   Patient requesting treatment only on the imid frontal scalp anddepression justt below that.    Interval Exam:  Linear scar on the mid frontal scalp, right forehead, and right lateral scalp.     Procedure Date: 02/16/2022  Staff: Dr. Reba Hudson  Medical student: Dante Mcgovern    Procedure:   CO2RE, fractional CO2 ablative laser treatment # 6  Approximate length of treatment: <10 minutes    Anesthesia and Premedication:  Anesthesia (topical): Benzocaine 20%/Lidocaine 8%/Tetracaine 4% ointment    Device Settings:  Diagnosis: Scar  Location: scalp  Mode(class/deep/mid/etc): deep  Frational coverage(%): 4%   Core(mJ): 50      Fotofinder photos: No    Description of Procedure:   The nature and purpose of the procedure, associated risks, possible consequences, complications, and alternative methods of treatment were explained in detail including but not limited to redness, swelling, peeling of the skin, eye injury, infection, bleeding, oozing, heat sensation, itching or acne.  Possible outcomes were reviewed including the following: no improvement, slight improvement, skin lightening or darkening. The possibility of permanent scarring was reviewed. Discussion that multiple treatments may be required was completed.     Photo consent was obtained and reviewed, a time out was performed, and informed consent was obtained.  The area was cleansed with [employer]. Protective eyewear was worn by the patient and all personnel in the treatment room  The patient confirmed the site to be treated. The laser energy output was verified by meter  reading. The Nirmal cooler and smoke evacuator devices were operated coincident with the CO2RE laser. The areas were treated with CO2RE laser as described. The patient tolerated the procedure well and no complications were noted. Verbal and written aftercare instructions were provided. Post procedure care including use of mild, gentle cleansers and moisturizers  for the first week following treatment was reviewed. The patient was recommend application of at least SPF 30 sunscreen daily  and avoidance of direct sunlight up to 6  months post procedure. The patient was discharged from the dermatology clinic in good condition.    The patient will follow up with nursing in 6 weeks. Patient declines 1 day follow-up.    Staff Involved:  Medical Student /Staff     Scribe Disclosure:  I, Enriqueta Mcnally, am serving as a scribe to document services personally performed by Reba Hudson MD based on data collection and the provider's statements to me.     I did the procedure(Dr. Hudson(  Provider Disclosure:   The documentation recorded by the scribe accurately reflects the services I personally performed and the decisions made by me.    Reba Hudson MD    Department of Dermatology  [facility]: Phone: [phone], Fax:[phone]  [facility]: Phone: [phone], Fax: [phone]            Again, thank you for allowing me to participate in the care of your patient.      Sincerely,    Reba Hudson MD

## 2022-02-16 NOTE — LETTER
Date:February 16, 2022      Provider requested that no letter be sent. Do not send.       [facility]

## 2022-02-16 NOTE — PATIENT INSTRUCTIONS
CO2RE Laser    I will experience redness, swelling, peeling, pain, and heat sensation. I may experience bleeding, oozing, itching, or acne. Risks are blistering, permanent scarring, temporary or permanent skin lightening or darkening, infection, and eye injury. I understand my outcome could be no improvement, slight improvement. Multiple treatments may be required.    TWO WEEKS BEFORE TREATMENT    Stop use of all Retinols   o Retin A, Tazorac,  anti-aging  products    Stop use of all glycolic acid treatments (longer if deeper peel)    Stop use of all salicylic acid products    Stop excessive sun exposure 8 weeks prior to treatment    Stop waxing    Stop abrasive scrubs    Stop microdermabrasion treatments    Stop hydroquinone(bleaching cream) 3 day sprior    Men should not shave 24 hours prior.      and bring  o Aquaphor   o Cetaphil cream  o Cetaphil gentle facial cleanser    THE DAY OF TREATMENT    Come to the appointment with no make-up, lotions or other products on the face    Take to your doctor about pain control after the procedure    If taking pain medication, please, bring a  or we will be unable to do the procedure      AFTER CARE INSTRUCTIONS    - Avoid the sun    - You may start wound care after 24 hours or sooner if comfortable.    - The first 3 days, start soaks, twice daily, soak for a few minutes with a clean cloth saturated with a dilute vinegar solution to help prevent infection.    Mix 2 tablespoons of household white vinegar in 2 cups of water.    Prepare fresh each day     Apply  Aquaphor to protect and soften the peeling skin.   - After 3 days, cleanse the face water with water and a mild/gentle cleanser (i.e. Vanicream facial cleanser or Cetaphil facial cleanserproducts) once daily. Then, apply a thin layer of Cetaphil cream several times a day for healing and comfort.Once the skin sloughing and flaking is over, ointment (Aquaphor and epidermal repair cream) is no longer  needed  - One sloughing and flaking is over sunblock  must be applied. Resume routine skin care and use of cosmetics as tolerated   - In some cases use of a bleaching cream may be recommended to start as well.  - Men may begin using an electric razor if they prefer to shave after 1 week.   - Apply sunblock every day and reapply every 2 hours when outside. Sun exposure can cause dark brown discoloration.    Reapply several times per day every day regardless of weather or indoors.    Use sunblock for face, SPF at least 30, broad spectrum (UVA & UVB)    Use diligently at least 3 months and avoid direct sun exposure.    Use a broad-rim hat if outdoors for a long time.      Who should I call with questions?       [facility]: [phone]       [facility]: [phone]       For urgent needs outside of business hours call the [facility] at [phone]        and ask for the dermatology resident on call

## 2022-02-22 ASSESSMENT — ENCOUNTER SYMPTOMS
NERVOUS/ANXIOUS: 1
LOSS OF CONSCIOUSNESS: 0
NAIL CHANGES: 1
SEIZURES: 0
TINGLING: 0
PARALYSIS: 0
INSOMNIA: 0
DIZZINESS: 0
PANIC: 0
TREMORS: 0
SKIN CHANGES: 1
HEADACHES: 0
NUMBNESS: 0
WEAKNESS: 0
DECREASED CONCENTRATION: 1
DISTURBANCES IN COORDINATION: 0
POOR WOUND HEALING: 1
MEMORY LOSS: 0
DEPRESSION: 0
SPEECH CHANGE: 0

## 2022-02-25 ENCOUNTER — OFFICE VISIT (OUTPATIENT)
Dept: ORTHOPEDICS | Facility: CLINIC | Age: 62
End: 2022-02-25
Payer: COMMERCIAL

## 2022-02-25 DIAGNOSIS — M20.41 HAMMER TOES OF BOTH FEET: ICD-10-CM

## 2022-02-25 DIAGNOSIS — L84 TYLOMA: Primary | ICD-10-CM

## 2022-02-25 DIAGNOSIS — M20.42 HAMMER TOES OF BOTH FEET: ICD-10-CM

## 2022-02-25 PROCEDURE — 99203 OFFICE O/P NEW LOW 30 MIN: CPT | Performed by: PODIATRIST

## 2022-02-25 ASSESSMENT — PATIENT HEALTH QUESTIONNAIRE - PHQ9: SUM OF ALL RESPONSES TO PHQ QUESTIONS 1-9: 3

## 2022-02-25 NOTE — LETTER
2/25/2022         RE: Bubba Branch  [address] [facility] [address]        Dear Colleague,    Thank you for referring your patient, Bubba Branch, to the [facility] [place]. Please see a copy of my visit note below.    Date of Service: 2/25/2022    Chief Complaint   Patient presents with     Consult     Right third toe.           HPI: Bubba is a 61 year old male who presents today for further evaluation of right 3rd toe pain. He went to the emergency room in January for this. They wanted him to follow up with a podiatrist. He saw his PCP and they found a callus. He relates that the area does cause him pain with walking.     Review of Systems: Answers for HPI/ROS submitted by the patient on 2/22/2022  General Symptoms: No  Skin Symptoms: Yes  HENT Symptoms: No  EYE SYMPTOMS: No  HEART SYMPTOMS: No  LUNG SYMPTOMS: No  INTESTINAL SYMPTOMS: No  URINARY SYMPTOMS: No  REPRODUCTIVE SYMPTOMS: Yes  SKELETAL SYMPTOMS: No  BLOOD SYMPTOMS: No  NERVOUS SYSTEM SYMPTOMS: Yes  MENTAL HEALTH SYMPTOMS: Yes  Changes in hair: No  Changes in moles/birth marks: Yes  Itching: No  Rashes: No  Changes in nails: Yes  Acne: Yes  Change in facial hair: No  Warts: No  Non-healing sores: Yes  Scarring: No  Flaking of skin: No  Color changes of hands/feet in cold : No  Sun sensitivity: No  Skin thickening: No  Scrotal pain or swelling: No  Erectile dysfunction: Yes  Penile discharge: No  Genital ulcers: No  Reduced libido: No  Trouble with coordination: No  Dizziness or trouble with balance: No  Fainting or black-out spells: No  Memory loss: No  Headache: No  Seizures: No  Speech problems: No  Tingling: No  Tremor: No  Weakness: No  Difficulty walking: No  Paralysis: No  Numbness: No  Nervous or Anxious: Yes  Depression: No  Trouble sleeping: No  Trouble thinking or concentrating: Yes  Mood changes: Yes  Panic attacks: No        PMH:   Past Medical History:   Diagnosis Date     Back injury  2008     Chemical dependency (H)      Depression      Head injury 2003, 2008       PSxH:   Past Surgical History:   Procedure Laterality Date     ENT SURGERY       ESOPHAGOSCOPY, GASTROSCOPY, DUODENOSCOPY (EGD), COMBINED N/A 6/29/2016    Procedure: COMBINED ESOPHAGOSCOPY, GASTROSCOPY, DUODENOSCOPY (EGD), BIOPSY SINGLE OR MULTIPLE;  Surgeon: Kim Cisneros MD;  Location:  GI       Allergies: Penicillins    SH:   Social History     Socioeconomic History     Marital status: Single     Spouse name: Not on file     Number of children: Not on file     Years of education: Not on file     Highest education level: Not on file   Occupational History     Not on file   Tobacco Use     Smoking status: Former Smoker     Years: 10.00     Smokeless tobacco: Never Used   Substance and Sexual Activity     Alcohol use: Not Currently     Drug use: No     Sexual activity: Yes     Partners: Female   Other Topics Concern     Parent/sibling w/ CABG, MI or angioplasty before 65F 55M? Not Asked   Social History Narrative     Not on file     Social Determinants of Health     Financial Resource Strain: Not on file   Food Insecurity: Not on file   Transportation Needs: Not on file   Physical Activity: Not on file   Stress: Not on file   Social Connections: Not on file   Intimate Partner Violence: Not on file   Housing Stability: Not on file       FH:   Family History   Problem Relation Age of Onset     Diabetes Mother      Heart Disease Mother      Hypertension Father        Objective:  Data Unavailable Data Unavailable Data Unavailable Data Unavailable Data Unavailable 0 lbs 0 oz    PT and DP pulses are 2/4 bilaterally. CRT is instant. Positive pedal hair.   Gross sensation is intact bilaterally.   Equinus is noted bilaterally. No pain with active or passive ROM of the ankle, MTJ, 1st ray, or halluces bilaterally. Pes planus with flexor stabilizing hammertoes noted to BL digits.   Nails normal bilaterally. No open lesions are noted.  Hyperkeratotic lesion noted to the right 3rd toe.     Assessment: Bubba is a 60 YO with right 3rd distal digit tyloma 2/2 hammertoe.      Plan:  - Pt seen and evaluated  - Tyloma debrided.  - Start silicone toe sleeve and Urea cream to the area daily. Pumice stone or nail file every few days.  - See again PRN.              Again, thank you for allowing me to participate in the care of your patient.        Sincerely,        Isma Gomes DPM

## 2022-02-25 NOTE — LETTER
Date:February 28, 2022      Provider requested that no letter be sent. Do not send.       [facility]

## 2022-02-25 NOTE — PROGRESS NOTES
Date of Service: 2/25/2022    Chief Complaint   Patient presents with     Consult     Right third toe.           HPI: Bubba is a 61 year old male who presents today for further evaluation of right 3rd toe pain. He went to the emergency room in January for this. They wanted him to follow up with a podiatrist. He saw his PCP and they found a callus. He relates that the area does cause him pain with walking.     Review of Systems: Answers for HPI/ROS submitted by the patient on 2/22/2022  General Symptoms: No  Skin Symptoms: Yes  HENT Symptoms: No  EYE SYMPTOMS: No  HEART SYMPTOMS: No  LUNG SYMPTOMS: No  INTESTINAL SYMPTOMS: No  URINARY SYMPTOMS: No  REPRODUCTIVE SYMPTOMS: Yes  SKELETAL SYMPTOMS: No  BLOOD SYMPTOMS: No  NERVOUS SYSTEM SYMPTOMS: Yes  MENTAL HEALTH SYMPTOMS: Yes  Changes in hair: No  Changes in moles/birth marks: Yes  Itching: No  Rashes: No  Changes in nails: Yes  Acne: Yes  Change in facial hair: No  Warts: No  Non-healing sores: Yes  Scarring: No  Flaking of skin: No  Color changes of hands/feet in cold : No  Sun sensitivity: No  Skin thickening: No  Scrotal pain or swelling: No  Erectile dysfunction: Yes  Penile discharge: No  Genital ulcers: No  Reduced libido: No  Trouble with coordination: No  Dizziness or trouble with balance: No  Fainting or black-out spells: No  Memory loss: No  Headache: No  Seizures: No  Speech problems: No  Tingling: No  Tremor: No  Weakness: No  Difficulty walking: No  Paralysis: No  Numbness: No  Nervous or Anxious: Yes  Depression: No  Trouble sleeping: No  Trouble thinking or concentrating: Yes  Mood changes: Yes  Panic attacks: No        PMH:   Past Medical History:   Diagnosis Date     Back injury 2008     Chemical dependency (H)      Depression      Head injury 2003, 2008       PSxH:   Past Surgical History:   Procedure Laterality Date     ENT SURGERY       ESOPHAGOSCOPY, GASTROSCOPY, DUODENOSCOPY (EGD), COMBINED N/A 6/29/2016    Procedure: COMBINED ESOPHAGOSCOPY,  GASTROSCOPY, DUODENOSCOPY (EGD), BIOPSY SINGLE OR MULTIPLE;  Surgeon: Kim Cisneros MD;  Location: [facility]       Allergies: Penicillins    SH:   Social History     Socioeconomic History     Marital status: Single     Spouse name: Not on file     Number of children: Not on file     Years of education: Not on file     Highest education level: Not on file   Occupational History     Not on file   Tobacco Use     Smoking status: Former Smoker     Years: 10.00     Smokeless tobacco: Never Used   Substance and Sexual Activity     Alcohol use: Not Currently     Drug use: No     Sexual activity: Yes     Partners: Female   Other Topics Concern     Parent/sibling w/ CABG, MI or angioplasty before 65F 55M? Not Asked   Social History Narrative     Not on file     Social Determinants of Health     Financial Resource Strain: Not on file   Food Insecurity: Not on file   Transportation Needs: Not on file   Physical Activity: Not on file   Stress: Not on file   Social Connections: Not on file   Intimate Partner Violence: Not on file   Housing Stability: Not on file       FH:   Family History   Problem Relation Age of Onset     Diabetes Mother      Heart Disease Mother      Hypertension Father        Objective:  Data Unavailable Data Unavailable Data Unavailable Data Unavailable Data Unavailable 0 lbs 0 oz    PT and DP pulses are 2/4 bilaterally. CRT is instant. Positive pedal hair.   Gross sensation is intact bilaterally.   Equinus is noted bilaterally. No pain with active or passive ROM of the ankle, MTJ, 1st ray, or halluces bilaterally. Pes planus with flexor stabilizing hammertoes noted to BL digits.   Nails normal bilaterally. No open lesions are noted. Hyperkeratotic lesion noted to the right 3rd toe.     Assessment: Bubba is a 62 YO with right 3rd distal digit tyloma 2/2 hammertoe.      Plan:  - Pt seen and evaluated  - Tyloma debrided.  - Start silicone toe sleeve and Urea cream to the area daily. Pumice stone or nail file  every few days.  - See again PRN.

## 2022-03-07 ENCOUNTER — OFFICE VISIT (OUTPATIENT)
Dept: FAMILY MEDICINE | Facility: CLINIC | Age: 62
End: 2022-03-07
Payer: COMMERCIAL

## 2022-03-07 ENCOUNTER — ANCILLARY PROCEDURE (OUTPATIENT)
Dept: GENERAL RADIOLOGY | Facility: CLINIC | Age: 62
End: 2022-03-07
Attending: PHYSICIAN ASSISTANT
Payer: COMMERCIAL

## 2022-03-07 VITALS
TEMPERATURE: 97.3 F | BODY MASS INDEX: 22.14 KG/M2 | RESPIRATION RATE: 16 BRPM | WEIGHT: 150 LBS | OXYGEN SATURATION: 97 % | DIASTOLIC BLOOD PRESSURE: 74 MMHG | SYSTOLIC BLOOD PRESSURE: 117 MMHG | HEART RATE: 85 BPM

## 2022-03-07 DIAGNOSIS — R06.2 WHEEZING: ICD-10-CM

## 2022-03-07 DIAGNOSIS — B96.89 BACTERIAL URI: Primary | ICD-10-CM

## 2022-03-07 DIAGNOSIS — J06.9 BACTERIAL URI: Primary | ICD-10-CM

## 2022-03-07 PROCEDURE — 71046 X-RAY EXAM CHEST 2 VIEWS: CPT | Performed by: RADIOLOGY

## 2022-03-07 PROCEDURE — 99213 OFFICE O/P EST LOW 20 MIN: CPT

## 2022-03-07 RX ORDER — BENZONATATE 100 MG/1
100 CAPSULE ORAL 3 TIMES DAILY PRN
Qty: 30 CAPSULE | Refills: 0 | Status: SHIPPED | OUTPATIENT
Start: 2022-03-07 | End: 2024-05-07

## 2022-03-07 RX ORDER — AZITHROMYCIN 250 MG/1
TABLET, FILM COATED ORAL
Qty: 6 TABLET | Refills: 0 | Status: SHIPPED | OUTPATIENT
Start: 2022-03-07 | End: 2022-03-12

## 2022-03-07 ASSESSMENT — ENCOUNTER SYMPTOMS
NEUROLOGICAL NEGATIVE: 1
SHORTNESS OF BREATH: 1
CARDIOVASCULAR NEGATIVE: 1
WHEEZING: 1
COUGH: 1
FATIGUE: 1

## 2022-03-07 NOTE — PATIENT INSTRUCTIONS
Increase fluids with water, Pedialyte, Gatorade, or rehydrating beverages. Alternate Tylenol and Ibuprofen as needed for aches, pains or fever. Follow clear liquid to BRAT diet (bananas, rice, applesauce, toast) as needed for any upset stomach. Rest as much as possible. Use OTC cough and cold medication. Run humidifier at night. Follow up in clinic if symptoms persist or worsen. This usually can last 7-10 days.     Patient Education     Adult Self-Care for Colds  Colds are caused by viruses. They can't be cured with antibiotics. But you can ease symptoms and support your body's efforts to heal itself. No matter which symptoms you have, be sure to:    Drink plenty of fluids (water or clear soup)    Stop smoking and drinking alcohol    Get plenty of rest    Stay away from secondhand smoke    Understand a fever    Take your temperature several times a day. If your fever is  100.4  F ( 38 C ) for more than a day, call your healthcare provider.    Relax, lie down. Go to bed if you want. Just get off your feet and rest. Also drink plenty of fluids to prevent dehydration.    Take acetaminophen or a nonsteroidal anti-inflammatory drug (NSAID) such as ibuprofen.    Treating a stuffy nose    Breathe steam or heated humidified air to open blocked nasal passages.  a hot shower or use a vaporizer. Be careful not to get burned by the steam.    Saline nasal sprays and decongestant tablets help open a stuffy nose. Antihistamines can also help. But they can cause side effects. These include drowsiness and drying of the eyes, nose, and mouth.    Soothe a sore throat and cough    Gargle every  2 hours with  1/4 teaspoon of salt dissolved in  1/2 cup of warm water. Suck on throat lozenges and cough drops to moisten your throat.    Cough medicines are available. But it's not clear how well they work.    Take acetaminophen or an NSAID, such as ibuprofen, to ease throat pain. Follow package directions on how much to use and how  often to take the medicine.    Ease digestive problems    Put fluids back into your body. Take frequent sips of clear liquids such as water or broth. Stay away from drinks that have a lot of sugar in them. These include juices and sodas. These can make diarrhea worse. Older children and adults can drink sports drinks.    As your appetite returns, you can go back to your normal diet. Ask your healthcare provider if there are any foods you should not have.    When to call your healthcare provider  When you first notice symptoms, ask your provider if you should take antiviral medicines. Antibiotics should not be taken for colds or flu. Also call your provider if you have any of these symptoms:    You don't feel better after 7 days    Belly pain or vomiting    Symptoms get worse, especially after a period of improvement    Fever of  100.4  F  ( 38 C) or higher, or fever that doesn't go down with medicine, or as advised by your healthcare provider    Dizziness or weakness    Slight shortness of breath or wheezing    Spotted, red, or very sore throat    Signs of dehydration:  ? Extreme thirst  ? Dark urine  ? Not urinating often  ? Dry mouth     When to call 911  Call 911 right away if any of these occur:    Chest pain    Coughing up blood    Fast or irregular heartbeat    Severe trouble breathing    Sudden confusion, fainting, or loss of consciousness     [employer] last reviewed this educational content on 10/1/2019    3119-8962 [employer]. All rights reserved. This information is not intended as a substitute for professional medical care. Always follow your healthcare professional's instructions.

## 2022-03-07 NOTE — PROGRESS NOTES
Assessment & Plan     Bacterial URI  - azithromycin (ZITHROMAX) 250 MG tablet  Dispense: 6 tablet; Refill: 0  - benzonatate (TESSALON) 100 MG capsule  Dispense: 30 capsule; Refill: 0    Wheezing  - XR Chest 2 Views  - azithromycin (ZITHROMAX) 250 MG tablet  Dispense: 6 tablet; Refill: 0  - benzonatate (TESSALON) 100 MG capsule  Dispense: 30 capsule; Refill: 0     XR shows inflammation of bronchials to my read. Based on 3 week history and symptoms, anitbiotic appropriate therapy.   Take antibiotic as directed. Increase fluids with water, Pedialyte, Gatorade, or rehydrating beverages. Alternate Tylenol and Ibuprofen as needed for aches, pains or fever. Follow clear liquid to BRAT diet (bananas, rice, applesauce, toast) as needed for any upset stomach. Rest as much as possible. Use OTC cough and cold medication. Run humidifier at night. Follow up in clinic if symptoms persist or worsen. This usually can last 7-10 days.       Return in about 1 week (around 3/14/2022), or if symptoms worsen or fail to improve.    Darrell Mac is a 61 year old male who presents to clinic today for the following health issues:  Chief Complaint   Patient presents with     Cough     chest congestion      Bubba presents with reports of cough and congestion x 3 weeks. He has had negative COVID tests. He denies fevers, nausea, vomiting. He is having some shortness of breath. Cough is productive. He has not tried any medicine at home. He has past history of smoking, no smoking currently.           Review of Systems   Constitutional: Positive for fatigue.   HENT: Positive for congestion.    Respiratory: Positive for cough, shortness of breath and wheezing.    Cardiovascular: Negative.    Skin: Negative.    Neurological: Negative.            Objective    /74 (BP Location: Right arm, Patient Position: Chair, Cuff Size: Adult Regular)   Pulse 85   Temp 97.3  F (36.3  C) (Tympanic)   Resp 16   Wt 68 kg (150 lb)   SpO2 97%    BMI 22.14 kg/m    Physical Exam  Constitutional:       Appearance: Normal appearance.   Eyes:      Extraocular Movements: Extraocular movements intact.      Conjunctiva/sclera: Conjunctivae normal.      Pupils: Pupils are equal, round, and reactive to light.   Cardiovascular:      Rate and Rhythm: Normal rate and regular rhythm.      Heart sounds: Normal heart sounds.   Pulmonary:      Effort: Pulmonary effort is normal.      Breath sounds: Wheezing present.   Musculoskeletal:         General: Normal range of motion.      Cervical back: Normal range of motion and neck supple.   Skin:     General: Skin is warm and dry.   Neurological:      General: No focal deficit present.      Mental Status: He is alert and oriented to person, place, and time.   Psychiatric:         Mood and Affect: Mood normal.         Behavior: Behavior normal.         Thought Content: Thought content normal.         Judgment: Judgment normal.              Sergio Peterson PA-C

## 2022-03-12 ENCOUNTER — HOSPITAL ENCOUNTER (EMERGENCY)
Facility: CLINIC | Age: 62
Discharge: HOME | End: 2022-03-13
Attending: EMERGENCY MEDICINE
Payer: COMMERCIAL

## 2022-03-12 DIAGNOSIS — R05.9 COUGH: ICD-10-CM

## 2022-03-12 DIAGNOSIS — J06.9 UPPER RESPIRATORY TRACT INFECTION, UNSPECIFIED TYPE: ICD-10-CM

## 2022-03-12 DIAGNOSIS — R06.2 WHEEZE: ICD-10-CM

## 2022-03-12 LAB
BASOPHILS # BLD AUTO: 0.1 10E3/UL (ref 0–0.2)
BASOPHILS NFR BLD AUTO: 1 %
EOSINOPHIL # BLD AUTO: 0.5 10E3/UL (ref 0–0.7)
EOSINOPHIL NFR BLD AUTO: 7 %
ERYTHROCYTE [DISTWIDTH] IN BLOOD BY AUTOMATED COUNT: 13.7 % (ref 10–15)
HCT VFR BLD AUTO: 46.5 % (ref 40–53)
HGB BLD-MCNC: 15.2 G/DL (ref 13.3–17.7)
IMM GRANULOCYTES # BLD: 0 10E3/UL
IMM GRANULOCYTES NFR BLD: 0 %
LYMPHOCYTES # BLD AUTO: 2.2 10E3/UL (ref 0.8–5.3)
LYMPHOCYTES NFR BLD AUTO: 27 %
MCH RBC QN AUTO: 29.6 PG (ref 26.5–33)
MCHC RBC AUTO-ENTMCNC: 32.7 G/DL (ref 31.5–36.5)
MCV RBC AUTO: 91 FL (ref 78–100)
MONOCYTES # BLD AUTO: 0.7 10E3/UL (ref 0–1.3)
MONOCYTES NFR BLD AUTO: 8 %
NEUTROPHILS # BLD AUTO: 4.7 10E3/UL (ref 1.6–8.3)
NEUTROPHILS NFR BLD AUTO: 57 %
NRBC # BLD AUTO: 0 10E3/UL
NRBC BLD AUTO-RTO: 0 /100
PLATELET # BLD AUTO: 302 10E3/UL (ref 150–450)
RBC # BLD AUTO: 5.14 10E6/UL (ref 4.4–5.9)
WBC # BLD AUTO: 8.3 10E3/UL (ref 4–11)

## 2022-03-12 PROCEDURE — 84484 ASSAY OF TROPONIN QUANT: CPT | Performed by: EMERGENCY MEDICINE

## 2022-03-12 PROCEDURE — 36415 COLL VENOUS BLD VENIPUNCTURE: CPT | Performed by: EMERGENCY MEDICINE

## 2022-03-12 PROCEDURE — 83880 ASSAY OF NATRIURETIC PEPTIDE: CPT | Performed by: EMERGENCY MEDICINE

## 2022-03-12 PROCEDURE — 93005 ELECTROCARDIOGRAM TRACING: CPT | Performed by: EMERGENCY MEDICINE

## 2022-03-12 PROCEDURE — 93010 ELECTROCARDIOGRAM REPORT: CPT | Performed by: EMERGENCY MEDICINE

## 2022-03-12 PROCEDURE — C9803 HOPD COVID-19 SPEC COLLECT: HCPCS | Performed by: EMERGENCY MEDICINE

## 2022-03-12 PROCEDURE — 85004 AUTOMATED DIFF WBC COUNT: CPT | Performed by: EMERGENCY MEDICINE

## 2022-03-12 PROCEDURE — 94640 AIRWAY INHALATION TREATMENT: CPT | Performed by: EMERGENCY MEDICINE

## 2022-03-12 PROCEDURE — 99285 EMERGENCY DEPT VISIT HI MDM: CPT | Mod: 25 | Performed by: EMERGENCY MEDICINE

## 2022-03-12 PROCEDURE — 80048 BASIC METABOLIC PNL TOTAL CA: CPT | Performed by: EMERGENCY MEDICINE

## 2022-03-12 PROCEDURE — U0005 INFEC AGEN DETEC AMPLI PROBE: HCPCS | Performed by: EMERGENCY MEDICINE

## 2022-03-12 PROCEDURE — 250N000013 HC RX MED GY IP 250 OP 250 PS 637: Performed by: EMERGENCY MEDICINE

## 2022-03-12 RX ORDER — ALBUTEROL SULFATE 90 UG/1
2 AEROSOL, METERED RESPIRATORY (INHALATION) ONCE
Status: COMPLETED | OUTPATIENT
Start: 2022-03-12 | End: 2022-03-12

## 2022-03-12 RX ADMIN — ALBUTEROL SULFATE 2 PUFF: 90 AEROSOL, METERED RESPIRATORY (INHALATION) at 23:45

## 2022-03-13 ENCOUNTER — APPOINTMENT (OUTPATIENT)
Dept: GENERAL RADIOLOGY | Facility: CLINIC | Age: 62
End: 2022-03-13
Attending: EMERGENCY MEDICINE
Payer: COMMERCIAL

## 2022-03-13 VITALS
HEART RATE: 68 BPM | HEIGHT: 70 IN | TEMPERATURE: 97.7 F | OXYGEN SATURATION: 98 % | RESPIRATION RATE: 16 BRPM | DIASTOLIC BLOOD PRESSURE: 66 MMHG | WEIGHT: 158.5 LBS | SYSTOLIC BLOOD PRESSURE: 108 MMHG | BODY MASS INDEX: 22.69 KG/M2

## 2022-03-13 LAB
ANION GAP SERPL CALCULATED.3IONS-SCNC: 6 MMOL/L (ref 3–14)
ATRIAL RATE - MUSE: 79 BPM
BUN SERPL-MCNC: 21 MG/DL (ref 7–30)
CALCIUM SERPL-MCNC: 8.7 MG/DL (ref 8.5–10.1)
CHLORIDE BLD-SCNC: 110 MMOL/L (ref 94–109)
CO2 SERPL-SCNC: 26 MMOL/L (ref 20–32)
CREAT SERPL-MCNC: 0.95 MG/DL (ref 0.66–1.25)
DIASTOLIC BLOOD PRESSURE - MUSE: NORMAL MMHG
GFR SERPL CREATININE-BSD FRML MDRD: >90 ML/MIN/1.73M2
GLUCOSE BLD-MCNC: 109 MG/DL (ref 70–99)
INTERPRETATION ECG - MUSE: NORMAL
NT-PROBNP SERPL-MCNC: 97 PG/ML (ref 0–900)
P AXIS - MUSE: 40 DEGREES
POTASSIUM BLD-SCNC: 4.4 MMOL/L (ref 3.4–5.3)
PR INTERVAL - MUSE: 140 MS
QRS DURATION - MUSE: 80 MS
QT - MUSE: 372 MS
QTC - MUSE: 426 MS
R AXIS - MUSE: 83 DEGREES
SARS-COV-2 RNA RESP QL NAA+PROBE: NEGATIVE
SODIUM SERPL-SCNC: 142 MMOL/L (ref 133–144)
SYSTOLIC BLOOD PRESSURE - MUSE: NORMAL MMHG
T AXIS - MUSE: 62 DEGREES
TROPONIN I SERPL HS-MCNC: 3 NG/L
VENTRICULAR RATE- MUSE: 79 BPM

## 2022-03-13 PROCEDURE — 250N000013 HC RX MED GY IP 250 OP 250 PS 637: Performed by: EMERGENCY MEDICINE

## 2022-03-13 PROCEDURE — 71046 X-RAY EXAM CHEST 2 VIEWS: CPT

## 2022-03-13 RX ORDER — DOXYCYCLINE 100 MG/1
100 CAPSULE ORAL ONCE
Status: COMPLETED | OUTPATIENT
Start: 2022-03-13 | End: 2022-03-13

## 2022-03-13 RX ORDER — PREDNISONE 20 MG/1
TABLET ORAL
Qty: 10 TABLET | Refills: 0 | Status: SHIPPED | OUTPATIENT
Start: 2022-03-13 | End: 2023-03-27

## 2022-03-13 RX ORDER — DOXYCYCLINE 100 MG/1
100 CAPSULE ORAL 2 TIMES DAILY
Qty: 10 CAPSULE | Refills: 0 | Status: SHIPPED | OUTPATIENT
Start: 2022-03-13 | End: 2022-03-18

## 2022-03-13 RX ORDER — ALBUTEROL SULFATE 90 UG/1
2 AEROSOL, METERED RESPIRATORY (INHALATION) EVERY 6 HOURS
Qty: 6.7 G | Refills: 0 | Status: SHIPPED | OUTPATIENT
Start: 2022-03-13 | End: 2024-05-07

## 2022-03-13 RX ADMIN — DOXYCYCLINE 100 MG: 100 CAPSULE ORAL at 00:47

## 2022-03-13 NOTE — ED PROVIDER NOTES
"ED Provider Note  [facility]    History     Chief Complaint   Patient presents with     Cough     \"I have a lung inflammation and cough. The cough is as bad or worse.\"     HPI  Bubba Branch is a 61 year old male with past medical history of polysubstance abuse and depression who presents to the Emergency Department for evaluation of persistent cough which initially started 4 weeks ago.    Patient developed symptoms of cough with associated whitish-yellow sputum production 4 week ago. Symptoms have been persistent and unchanged since onset. Denies any fever, chills, nausea, vomiting, hemoptysis, chest pain or shortness of breath. He was seen at walk in clinic on 3/7 and was started on a course of antibiotics, which he completed. However, he presents to the Emergency Department today reporting no change in symptoms despite completing the course of antibiotics.     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 A bunk-mate has a 2 month history of cough. Patient did not know of any known diagnosis of this gentleman, but denied any knowledge of hemoptysis.     PMHx:  Polysubstance abuse  EtOH abuse  Depression/axiety    PSHx:  Facial fx repair (with ENT)    Medications:  Reviewed with patient- currently taking lexapro, no recent changes    Allergies:  Penicillins- unclear reaction    Social history:  Previous crack cocaine use for which he completed treatment, not currently using. Previous heavy alcohol use, sober since Nov 2021. 10+ pack year history of tobacco use, he quit >10 years ago.    Family history:  Mother- breast cancer, diabetes, endometrial cancer  Grandfather (maternal)- esophageal cancer     Review of Systems  A complete review of systems was performed with pertinent positives and negatives noted in the HPI, and all other systems negative.    Physical Exam   /65   Pulse 73   Temp 97.7  F (36.5  C) (Oral)   Resp 16   Ht 1.778 m " "(5' 10\")   Wt 71.9 kg (158 lb 8 oz)   SpO2 100%   BMI 22.74 kg/m    Constitutional: Thin, pleasant gentleman. No acute distress. Occasional weak cough.  Eyes: PERRL, no conjunctival injection, no scleral icterus  HENT: NCAT  CV: RRR, no murmur, peripheral pulses are palpable, warm extremities. No lower extremity edema.   RESP: Diffuse wheezes to bilateral lungs on posterior exam. No crackles heard. On room air.   GI: soft, non-tender to palpation  MSK: No gross deformities appreciated. No tenderness to palpation of bilateral calves.  Skin: Warm, dry. No rashes  Neuro: Alert, CNs II-XII grossly intact. Sensation and motor function of extremities grossly intact.  Psych: Appropriate mood and affect.    ED Course      Procedures     I reviewed current and previous imaging.  I reviewed current and previous labs            EKG Interpretation:      Interpreted by Denton Larson MD  Time reviewed: 11:35pm  Symptoms at time of EKG: cough, known bronchitis  Rhythm: normal sinus   Rate: 79  Axis: Normal  Ectopy: none  Conduction: normal  ST Segments/ T Waves: No ST-T wave changes  Q Waves: none  Comparison to prior: Unchanged from 5/12/2009    Clinical Impression: no sign of acute ischemic change, normal EKG          Results for orders placed or performed during the hospital encounter of 03/12/22   XR Chest 2 Views     Status: None    Narrative    EXAM: XR CHEST 2 VW  LOCATION: [facility]  DATE/TIME: 3/12/2022 11:57 PM    INDICATION: Persistent cough.  COMPARISON: 3/7/2022.    FINDINGS: The heart size is normal. Right lung nodule noted on the prior exam measuring approximately 2 cm. The lungs are otherwise clear. No pneumothorax or pleural effusion.      Impression    IMPRESSION: Persistent 2 cm right lung nodule of uncertain etiology. There is again projects over the anterior fifth rib and may be a rib lesion. No other acute abnormality.   Troponin I     Status: Normal   Result " Value Ref Range    Troponin I High Sensitivity 3 <79 ng/L   Basic metabolic panel     Status: Abnormal   Result Value Ref Range    Sodium 142 133 - 144 mmol/L    Potassium 4.4 3.4 - 5.3 mmol/L    Chloride 110 (H) 94 - 109 mmol/L    Carbon Dioxide (CO2) 26 20 - 32 mmol/L    Anion Gap 6 3 - 14 mmol/L    Urea Nitrogen 21 7 - 30 mg/dL    Creatinine 0.95 0.66 - 1.25 mg/dL    Calcium 8.7 8.5 - 10.1 mg/dL    Glucose 109 (H) 70 - 99 mg/dL    GFR Estimate >90 >60 mL/min/1.73m2   Nt probnp inpatient (BNP)     Status: Normal   Result Value Ref Range    N terminal Pro BNP Inpatient 97 0 - 900 pg/mL   Symptomatic; Unknown COVID-19 Virus (Coronavirus) by PCR Nasopharyngeal     Status: Normal    Specimen: Nasopharyngeal; Swab   Result Value Ref Range    SARS CoV2 PCR Negative Negative    Narrative    Testing was performed using the volodymyr  SARS-CoV-2 & Influenza A/B Assay on the volodymyr  Comfort  System.  This test should be ordered for the detection of SARS-COV-2 in individuals who meet SARS-CoV-2 clinical and/or epidemiological criteria. Test performance is unknown in asymptomatic patients.  This test is for in vitro diagnostic use under the FDA EUA for laboratories certified under CLIA to perform moderate and/or high complexity testing. This test has not been FDA cleared or approved.  A negative test does not rule out the presence of PCR inhibitors in the specimen or target RNA in concentration below the limit of detection for the assay. The possibility of a false negative should be considered if the patient's recent exposure or clinical presentation suggests COVID-19.  [facility] are certified under the Clinical Laboratory Improvement Amendments of 1988 (CLIA-88) as qualified to perform moderate and/or high complexity laboratory testing.   CBC with platelets and differential     Status: None   Result Value Ref Range    WBC Count 8.3 4.0 - 11.0 10e3/uL    RBC Count 5.14 4.40 - 5.90 10e6/uL    Hemoglobin 15.2 13.3  - 17.7 g/dL    Hematocrit 46.5 40.0 - 53.0 %    MCV 91 78 - 100 fL    MCH 29.6 26.5 - 33.0 pg    MCHC 32.7 31.5 - 36.5 g/dL    RDW 13.7 10.0 - 15.0 %    Platelet Count 302 150 - 450 10e3/uL    % Neutrophils 57 %    % Lymphocytes 27 %    % Monocytes 8 %    % Eosinophils 7 %    % Basophils 1 %    % Immature Granulocytes 0 %    NRBCs per 100 WBC 0 <1 /100    Absolute Neutrophils 4.7 1.6 - 8.3 10e3/uL    Absolute Lymphocytes 2.2 0.8 - 5.3 10e3/uL    Absolute Monocytes 0.7 0.0 - 1.3 10e3/uL    Absolute Eosinophils 0.5 0.0 - 0.7 10e3/uL    Absolute Basophils 0.1 0.0 - 0.2 10e3/uL    Absolute Immature Granulocytes 0.0 <=0.4 10e3/uL    Absolute NRBCs 0.0 10e3/uL   CBC with platelets differential     Status: None    Narrative    The following orders were created for panel order CBC with platelets differential.  Procedure                               Abnormality         Status                     ---------                               -----------         ------                     CBC with platelets and d...[[id number]]                      Final result                 Please view results for these tests on the individual orders.     Medications   albuterol (PROVENTIL HFA/VENTOLIN HFA) inhaler (2 puffs Inhalation Given 3/12/22 1513)   doxycycline hyclate (VIBRAMYCIN) capsule 100 mg (100 mg Oral Given 3/13/22 0047)        Assessments & Plan (with Medical Decision Making)     61 year old male with past medical history of previous polysubstance abuse (not currently using) and depression who presents for evaluation of 4 week history of persistent cough. Has been COVID-19 negative x3. He was treated with 6 day course of azithromycin on 3/7, and despite completing this course, his symptoms have remained the same. He endorses associated sputum production, but no chest pain or shortness of breath. On presentation he has a weak sounding cough, but appears non-toxic, with no increased work of breathing and saturating in the mid 90s on  room air.    Differential diagnosis includes persistent cough after viral infection, bacterial pneumonia, fungal infection, post-obstructive pneumonia. He has no associated chest pain, history of cardiac problems or risk factors for pulmonary embolism. He has considerable wheezing on exam today, which also has been documented during his previous visit last week. Suspect underlying component of obstructive lung disease given history of tobacco use, wheezing, and symptomatic improvement after albuterol today.    EKG without ischemic changes, troponin normal. CXR today without obvious infiltrate, appears similar to previous, but on personal review there is some haziness of the right lung. Will add doxycycline for 5 days to complete treatment for previously diagnosed community acquired pneumonia as azithromycin is inadequate. Additionally will discharge with 5 day course of oral prednisone and albuterol inhaler. Discussed that patient should establish care with a primary care provider, and seek follow up 10-14 days from now.    Incidental finding of ~2cm lung nodule in the right lung again identified today on CXR. Patient aware of nodule. Discussed the finding with patient again today and he understands importance of follow up with a primary care provider.     I have reviewed the nursing notes. I have reviewed the findings, diagnosis, plan and need for follow up with the patient.    New Prescriptions    ALBUTEROL (PROAIR HFA) 108 (90 BASE) MCG/ACT INHALER    Inhale 2 puffs into the lungs every 6 hours for 10 days    DOXYCYCLINE HYCLATE (VIBRAMYCIN) 100 MG CAPSULE    Take 1 capsule (100 mg) by mouth 2 times daily for 5 days    PREDNISONE (DELTASONE) 20 MG TABLET    Take two tablets (= 40mg) each day for 5 (five) days       Final diagnoses:   Cough   Wheeze   Upper respiratory tract infection, unspecified type     Seen and discussed with Dr. Larson.    Jesus Villeda, MS4  ePrep  [employer]  11:57 PM March  12, 2022    --  Denton Larson MD  [facility] EMERGENCY DEPARTMENT  3/12/2022      --    ED Attending Physician Attestation    I Denton Larson MD, cared for this patient with the Medical Student. I performed, or re-performed, the physical exam and medical decision-making. I have verified the accuracy of all the medical student findings and documentation above, and have edited as necessary.    Summary of HPI, PE, ED Course   Patient is a 61 year old male evaluated in the emergency department for persistent cough x4 weeks not improved despite azithromycin.  Exam notable for cough and wheezing noted on exam, wheezing significantly improved after albuterol inhaler here in the ER.  Otherwise normal heart lung exam, no respiratory distress normal vital signs no fever.. ED course notable for relatively unchanged chest x-ray without signs of obvious pneumonia, Covid test negative, EKG normal troponin normal. After the completion of care in the emergency department, the patient was discharged.  Will give doxycycline to cover for community-acquired pneumonia, along with inhaler and prednisone for persistent wheezing.    Critical Care & Procedures  Not applicable.    Medical Decision Making  The medical record was reviewed and interpreted.  Current labs reviewed and interpreted.  Previous labs reviewed and interpreted.  Current images reviewed and interpreted: No obvious pneumonia, nodule noted.  Previous images reviewed and interpreted: Same as previous.  EKG reviewed and interpreted: Normal sinus rhythm no sign of ischemia.      Denton Larson MD  Emergency Medicine          Marsha, Denton Almeida MD  03/13/22 2040

## 2022-03-13 NOTE — ED NOTES
He is here today for a cough that he has had for over a month. He was seen and tested for covid a month ago. He said that the 2 tests that he did were negative. He has had a productive cough. He said that he had been given a RX for an abx but his sx did not improve. He is a/o. skin is warm and dry. Respirations are regular and non labored.

## 2022-03-13 NOTE — DISCHARGE INSTRUCTIONS
Please make an appointment to follow up with [facility] (phone: [phone]), Primary Care - [facility] (phone: [phone]), and Primary Care - [facility] (phone: [phone]) in 7-10 days even if entirely better.      You were prescribed an antibiotic, Doxycycline, which you will take for 5 days total. This is complete treatment for your previously diagnosed pneumonia (lung infection).    Additionally, you were prescribed an inhaler (albuterol) and steroid pill (prednisone) to help with the wheezing.     The inhaler (albuterol) can be taken as needed for wheezing every 6 hours.    The steroid pill (prednisone) should be taken daily for 5 days.    You should anticipate improvement of your cough and wheezing. If you continue to have symptoms, request a follow up visit with your primary care provider. We have listed a number of primary care clinics above to establish care if you do not have a primary care doctor.      Follow up with PCP about nodule noted on CXR

## 2022-03-15 ENCOUNTER — MYC MEDICAL ADVICE (OUTPATIENT)
Dept: DERMATOLOGY | Facility: CLINIC | Age: 62
End: 2022-03-15
Payer: COMMERCIAL

## 2022-03-22 ENCOUNTER — LAB REQUISITION (OUTPATIENT)
Dept: LAB | Facility: CLINIC | Age: 62
End: 2022-03-22
Payer: COMMERCIAL

## 2022-03-22 DIAGNOSIS — Z12.11 ENCOUNTER FOR SCREENING FOR MALIGNANT NEOPLASM OF COLON: ICD-10-CM

## 2022-03-22 LAB — HEMOCCULT STL QL IA: POSITIVE

## 2022-03-22 PROCEDURE — 82274 ASSAY TEST FOR BLOOD FECAL: CPT | Mod: ORL | Performed by: INTERNAL MEDICINE

## 2022-03-24 ENCOUNTER — TRANSCRIBE ORDERS (OUTPATIENT)
Dept: URGENT CARE | Facility: CLINIC | Age: 62
End: 2022-03-24
Payer: COMMERCIAL

## 2022-03-24 DIAGNOSIS — R19.5 POSITIVE FIT (FECAL IMMUNOCHEMICAL TEST): Primary | ICD-10-CM

## 2022-03-31 ENCOUNTER — TELEPHONE (OUTPATIENT)
Dept: GASTROENTEROLOGY | Facility: CLINIC | Age: 62
End: 2022-03-31
Payer: COMMERCIAL

## 2022-03-31 DIAGNOSIS — Z11.59 ENCOUNTER FOR SCREENING FOR OTHER VIRAL DISEASES: Primary | ICD-10-CM

## 2022-03-31 NOTE — TELEPHONE ENCOUNTER
Screening Questions  BlueKIND OF PREP RedLOCATION [review exclusion criteria] GreenSEDATION TYPE  1. Have you had a positive covid test in the last 90 days? N     2. Are you active on mychart? Y    3. What insurance is in the chart? [employer]     3.   Ordering/Referring Provider: TAMMI    4. BMI 23 [BMI OVER 40-EXTENDED PREP]  If greater than 40 review exclusion criteria [PAC APPT IF @ [facility]]        5.  Respiratory Screening :  [If yes to any of the following HOSPITAL setting only]     Do you use daily home oxygen? N    Do you have mod to severe Obstructive Sleep Apnea? N  [OKAY @ [facility] SH PH RI]   Do you have Pulmonary Hypertension? N     Do you have UNCONTROLLED asthma? N        6.   Have you had a heart or lung transplant? N      7.   Are you currently on dialysis? N [ If yes, G-PREP & HOSPITAL setting only]     8.   Do you have chronic kidney disease? N [ If yes, G-PREP ]    9.   Have you had a stroke or Transient ischemic attack (TIA - aka  mini stroke ) within 6 months?  N (If yes, please review exclusion criteria)    10.   In the past 6 months, have you had any heart related issues including cardiomyopathy or heart attack? N           If yes, did it require cardiac stenting or other implantable device?       11.   Do you have any implantable devices in your body (pacemaker, defib, LVAD)? N (If yes, please review exclusion criteria)    12.   Do you take nitroglycerin? N           If yes, how often?   (if yes, HOSPITAL setting ONLY)    13.   Are you currently taking any blood thinners? N           [IF YES, INFORM PATIENT TO FOLLOW UP W/ ORDERING PROVIDER FOR BRIDGING INSTRUCTIONS]     14.   Do you have a diagnosis of diabetes? N   [ If yes, G-PREP ]    15.   [FEMALES] Are you currently pregnant?     If yes, how many weeks?     16.   Are you taking any prescription pain medications on a routine schedule?  N  [ If yes, EXTENDED PREP.] [If yes, MAC]    17.   Do you have any chemical dependencies such  as alcohol, street drugs, or methadone?  IN RECOVERY (NOT USING SINCE NOV 2021) [If yes, MAC]    18.   Do you have any history of post-traumatic stress syndrome, severe anxiety or history of psychosis?  MODERATE ANXIETY  [If yes, MAC]    19.   Do you have a significant intellectual disability?  2 TBI'S, BEING ASSESSED[If yes, MAC]    20.   Do you transfer independently?  Y    21.  On a regular basis do you go 3-5 days between bowel movements? N   [ If yes, EXTENDED PREP.]    22.   Preferred LOCAL Pharmacy for Pre Prescription        "[id number] - [place], [place] - [facility]      Scheduling Details      Caller : Bubba Branch  (Please ask for phone number if not scheduled by patient)    Type of Procedure Scheduled: COLON  Which Colonoscopy Prep was Sent?: MPREP  YULI CF PATIENTS & GROEN'S PATIENTS NEEDS EXTENDED PREP  Surgeon: CHANO  Date of Procedure: 4/7/22  Location: [facility]      Sedation Type: MAC  Conscious Sedation- Needs  for 6 hours after the procedure  MAC/General-Needs  for 24 hours after procedure    Pre-op Required at [facility] [place], [place] and OR for MAC sedation: N  (advise patient they will need a pre-op prior to procedure -)      Informed patient they will need an adult  Y  Cannot take any type of public or medical transportation alone    Pre-Procedure Covid test to be completed at [facility] or Externally[place]    Confirmed Nurse will call to complete assessment Y    Additional comments:

## 2022-04-01 ENCOUNTER — TELEPHONE (OUTPATIENT)
Dept: GASTROENTEROLOGY | Facility: CLINIC | Age: 62
End: 2022-04-01

## 2022-04-01 NOTE — TELEPHONE ENCOUNTER
Patient scheduled for colonoscopy on 4/7/22.     Covid test scheduled: 4/4/22    Arrival time: 0900    Facility location: [facility]    Sedation type: MAC     Indication for procedure: +fit     Anticoagulations? no     Bowel prep recommendation: Miralax/Magnesium citrate/Dulcolax      Pre visit planning completed.    Mary Mary RN

## 2022-04-04 ENCOUNTER — LAB (OUTPATIENT)
Dept: LAB | Facility: CLINIC | Age: 62
End: 2022-04-04
Payer: COMMERCIAL

## 2022-04-04 DIAGNOSIS — Z11.59 ENCOUNTER FOR SCREENING FOR OTHER VIRAL DISEASES: ICD-10-CM

## 2022-04-04 LAB — SARS-COV-2 RNA RESP QL NAA+PROBE: NEGATIVE

## 2022-04-04 PROCEDURE — 99000 SPECIMEN HANDLING OFFICE-LAB: CPT | Performed by: PATHOLOGY

## 2022-04-04 PROCEDURE — U0005 INFEC AGEN DETEC AMPLI PROBE: HCPCS | Mod: 90 | Performed by: PATHOLOGY

## 2022-04-04 PROCEDURE — U0003 INFECTIOUS AGENT DETECTION BY NUCLEIC ACID (DNA OR RNA); SEVERE ACUTE RESPIRATORY SYNDROME CORONAVIRUS 2 (SARS-COV-2) (CORONAVIRUS DISEASE [COVID-19]), AMPLIFIED PROBE TECHNIQUE, MAKING USE OF HIGH THROUGHPUT TECHNOLOGIES AS DESCRIBED BY CMS-2020-01-R: HCPCS | Mod: 90 | Performed by: PATHOLOGY

## 2022-04-04 NOTE — TELEPHONE ENCOUNTER
Pre assessment questions completed for upcoming colonoscopy procedure scheduled on 4/7/22    COVID test scheduled 4/4/22    Reviewed procedural arrival time 0900 and facility location [facility].    Designated  policy reviewed. Instructed to have someone stay 24 hours post procedure.     Reviewed Miralax prep instructions with patient. No fiber/iron supplements or foods that contain nuts/seeds 7 days prior to procedure.     Patient verbalized understanding and had no questions or concerns at this time.    Mary Mary RN

## 2022-04-06 ENCOUNTER — ANESTHESIA EVENT (OUTPATIENT)
Dept: SURGERY | Facility: AMBULATORY SURGERY CENTER | Age: 62
End: 2022-04-06
Payer: COMMERCIAL

## 2022-04-07 ENCOUNTER — ANESTHESIA (OUTPATIENT)
Dept: SURGERY | Facility: AMBULATORY SURGERY CENTER | Age: 62
End: 2022-04-07
Payer: COMMERCIAL

## 2022-04-07 ENCOUNTER — HOSPITAL ENCOUNTER (OUTPATIENT)
Facility: AMBULATORY SURGERY CENTER | Age: 62
Discharge: HOME | End: 2022-04-07
Attending: INTERNAL MEDICINE
Payer: COMMERCIAL

## 2022-04-07 VITALS
OXYGEN SATURATION: 100 % | HEIGHT: 70 IN | RESPIRATION RATE: 16 BRPM | SYSTOLIC BLOOD PRESSURE: 109 MMHG | HEART RATE: 76 BPM | BODY MASS INDEX: 23.62 KG/M2 | WEIGHT: 165 LBS | DIASTOLIC BLOOD PRESSURE: 64 MMHG | TEMPERATURE: 97.4 F

## 2022-04-07 VITALS — HEART RATE: 89 BPM

## 2022-04-07 DIAGNOSIS — Z12.11 COLON CANCER SCREENING: Primary | ICD-10-CM

## 2022-04-07 LAB — COLONOSCOPY: NORMAL

## 2022-04-07 PROCEDURE — 88305 TISSUE EXAM BY PATHOLOGIST: CPT | Mod: TC | Performed by: INTERNAL MEDICINE

## 2022-04-07 PROCEDURE — 88305 TISSUE EXAM BY PATHOLOGIST: CPT | Mod: 26 | Performed by: PATHOLOGY

## 2022-04-07 PROCEDURE — 45385 COLONOSCOPY W/LESION REMOVAL: CPT

## 2022-04-07 RX ORDER — SODIUM CHLORIDE, SODIUM LACTATE, POTASSIUM CHLORIDE, CALCIUM CHLORIDE 600; 310; 30; 20 MG/100ML; MG/100ML; MG/100ML; MG/100ML
INJECTION, SOLUTION INTRAVENOUS CONTINUOUS
Status: DISCONTINUED | OUTPATIENT
Start: 2022-04-07 | End: 2022-04-08 | Stop reason: HOSPADM

## 2022-04-07 RX ORDER — OXYCODONE HYDROCHLORIDE 5 MG/1
5 TABLET ORAL EVERY 4 HOURS PRN
Status: DISCONTINUED | OUTPATIENT
Start: 2022-04-07 | End: 2022-04-08 | Stop reason: HOSPADM

## 2022-04-07 RX ORDER — ONDANSETRON 2 MG/ML
4 INJECTION INTRAMUSCULAR; INTRAVENOUS
Status: DISCONTINUED | OUTPATIENT
Start: 2022-04-07 | End: 2022-04-08 | Stop reason: HOSPADM

## 2022-04-07 RX ORDER — FENTANYL CITRATE 50 UG/ML
25 INJECTION, SOLUTION INTRAMUSCULAR; INTRAVENOUS
Status: DISCONTINUED | OUTPATIENT
Start: 2022-04-07 | End: 2022-04-08 | Stop reason: HOSPADM

## 2022-04-07 RX ORDER — LIDOCAINE 40 MG/G
CREAM TOPICAL
Status: DISCONTINUED | OUTPATIENT
Start: 2022-04-07 | End: 2022-04-08 | Stop reason: HOSPADM

## 2022-04-07 RX ORDER — ONDANSETRON 4 MG/1
4 TABLET, ORALLY DISINTEGRATING ORAL EVERY 30 MIN PRN
Status: DISCONTINUED | OUTPATIENT
Start: 2022-04-07 | End: 2022-04-08 | Stop reason: HOSPADM

## 2022-04-07 RX ORDER — LIDOCAINE HYDROCHLORIDE 20 MG/ML
INJECTION, SOLUTION INFILTRATION; PERINEURAL PRN
Status: DISCONTINUED | OUTPATIENT
Start: 2022-04-07 | End: 2022-04-07

## 2022-04-07 RX ORDER — MEPERIDINE HYDROCHLORIDE 25 MG/ML
12.5 INJECTION INTRAMUSCULAR; INTRAVENOUS; SUBCUTANEOUS
Status: DISCONTINUED | OUTPATIENT
Start: 2022-04-07 | End: 2022-04-08 | Stop reason: HOSPADM

## 2022-04-07 RX ORDER — SIMETHICONE
LIQUID (ML) MISCELLANEOUS PRN
Status: DISCONTINUED | OUTPATIENT
Start: 2022-04-07 | End: 2022-04-07 | Stop reason: HOSPADM

## 2022-04-07 RX ORDER — ONDANSETRON 2 MG/ML
4 INJECTION INTRAMUSCULAR; INTRAVENOUS EVERY 30 MIN PRN
Status: DISCONTINUED | OUTPATIENT
Start: 2022-04-07 | End: 2022-04-08 | Stop reason: HOSPADM

## 2022-04-07 RX ORDER — PROPOFOL 10 MG/ML
INJECTION, EMULSION INTRAVENOUS CONTINUOUS PRN
Status: DISCONTINUED | OUTPATIENT
Start: 2022-04-07 | End: 2022-04-07

## 2022-04-07 RX ORDER — HYDROMORPHONE HYDROCHLORIDE 1 MG/ML
0.2 INJECTION, SOLUTION INTRAMUSCULAR; INTRAVENOUS; SUBCUTANEOUS EVERY 5 MIN PRN
Status: DISCONTINUED | OUTPATIENT
Start: 2022-04-07 | End: 2022-04-08 | Stop reason: HOSPADM

## 2022-04-07 RX ORDER — FENTANYL CITRATE 50 UG/ML
25 INJECTION, SOLUTION INTRAMUSCULAR; INTRAVENOUS EVERY 5 MIN PRN
Status: DISCONTINUED | OUTPATIENT
Start: 2022-04-07 | End: 2022-04-08 | Stop reason: HOSPADM

## 2022-04-07 RX ADMIN — LIDOCAINE HYDROCHLORIDE 50 MG: 20 INJECTION, SOLUTION INFILTRATION; PERINEURAL at 08:51

## 2022-04-07 RX ADMIN — SODIUM CHLORIDE, SODIUM LACTATE, POTASSIUM CHLORIDE, CALCIUM CHLORIDE: 600; 310; 30; 20 INJECTION, SOLUTION INTRAVENOUS at 08:07

## 2022-04-07 RX ADMIN — PROPOFOL 250 MCG/KG/MIN: 10 INJECTION, EMULSION INTRAVENOUS at 08:51

## 2022-04-07 ASSESSMENT — COPD QUESTIONNAIRES: COPD: 1

## 2022-04-07 NOTE — ANESTHESIA CARE TRANSFER NOTE
Patient: Bubba Branch    Procedure: Procedure(s):  COLONOSCOPY, WITH POLYPECTOMY, WITH CLIP       Diagnosis: Positive FIT (fecal immunochemical test) [R19.5]  Diagnosis Additional Information: No value filed.    Anesthesia Type:   No value filed.     Note:    Oropharynx: oropharynx clear of all foreign objects and spontaneously breathing  Level of Consciousness: awake  Oxygen Supplementation: room air    Independent Airway: airway patency satisfactory and stable  Dentition: dentition unchanged      Patient transferred to: Phase II  Comments: Report to Phase II RN. Resps easy and regular.   Handoff Report: Identifed the Patient, Identified the Reponsible Provider, Reviewed the pertinent medical history, Discussed the surgical course, Reviewed Intra-OP anesthesia mangement and issues during anesthesia, Set expectations for post-procedure period and Allowed opportunity for questions and acknowledgement of understanding      Vitals:  Vitals Value Taken Time   /69 04/07/22 0939   Temp 36.3  C (97.4  F) 04/07/22 0939   Pulse 84 04/07/22 0939   Resp 16 04/07/22 0939   SpO2 100 % 04/07/22 0939       Electronically Signed By: BERNARDO MUSE CRNA  April 7, 2022  9:42 AM

## 2022-04-07 NOTE — ANESTHESIA POSTPROCEDURE EVALUATION
Patient: Bubba Branch    Procedure: Procedure(s):  COLONOSCOPY, WITH POLYPECTOMY, WITH CLIP       Anesthesia Type:  MAC    Note:  Disposition: Outpatient   Postop Pain Control: Uneventful            Sign Out: Well controlled pain   PONV: No   Neuro/Psych: Uneventful            Sign Out: Acceptable/Baseline neuro status   Airway/Respiratory: Uneventful            Sign Out: Acceptable/Baseline resp. status   CV/Hemodynamics: Uneventful            Sign Out: Acceptable CV status; No obvious hypovolemia; No obvious fluid overload   Other NRE: NONE   DID A NON-ROUTINE EVENT OCCUR? No           Last vitals:  Vitals Value Taken Time   /64 04/07/22 0956   Temp 36.3  C (97.4  F) 04/07/22 0956   Pulse 76 04/07/22 0956   Resp 16 04/07/22 0956   SpO2 100 % 04/07/22 0956       Electronically Signed By: Joe Peng MD  April 7, 2022  5:10 PM

## 2022-04-07 NOTE — ANESTHESIA PREPROCEDURE EVALUATION
Anesthesia Pre-Procedure Evaluation    Patient: Bubba Branch   MRN: [id number] : 1960        Procedure : Procedure(s):  COLONOSCOPY, WITH POLYPECTOMY, WITH CLIP          Past Medical History:   Diagnosis Date     Back injury      Chemical dependency (H)      Depression      Head injury 2008      Past Surgical History:   Procedure Laterality Date     ENT SURGERY       ESOPHAGOSCOPY, GASTROSCOPY, DUODENOSCOPY (EGD), COMBINED N/A 2016    Procedure: COMBINED ESOPHAGOSCOPY, GASTROSCOPY, DUODENOSCOPY (EGD), BIOPSY SINGLE OR MULTIPLE;  Surgeon: Kim Cisneros MD;  Location:  GI      Allergies   Allergen Reactions     Penicillins Unknown     Pt unsure of reaction, possible childhood rxn      Social History     Tobacco Use     Smoking status: Former Smoker     Years: 10.00     Smokeless tobacco: Never Used   Substance Use Topics     Alcohol use: Not Currently      Wt Readings from Last 1 Encounters:   22 74.8 kg (165 lb)        Anesthesia Evaluation   Pt has had prior anesthetic.     No history of anesthetic complications       ROS/MED HX  ENT/Pulmonary:     (+) COPD,     Neurologic:       Cardiovascular:       METS/Exercise Tolerance: >4 METS    Hematologic:       Musculoskeletal:       GI/Hepatic:       Renal/Genitourinary:       Endo:       Psychiatric/Substance Use:     (+) psychiatric history depression Recreational drug usage: Other (Comment) (Polysubstance abuse).    Infectious Disease:       Malignancy:       Other:            Physical Exam    Airway  airway exam normal           Respiratory Devices and Support         Dental  no notable dental history         Cardiovascular   cardiovascular exam normal          Pulmonary   pulmonary exam normal                OUTSIDE LABS:  CBC:   Lab Results   Component Value Date    WBC 8.3 2022    WBC 14.1 (H) 10/09/2021    HGB 15.2 2022    HGB 16.5 10/09/2021    HCT 46.5 2022    HCT 49.0 10/09/2021     2022      10/09/2021     BMP:   Lab Results   Component Value Date     03/12/2022     02/03/2022    POTASSIUM 4.4 03/12/2022    POTASSIUM 3.6 02/03/2022    CHLORIDE 110 (H) 03/12/2022    CHLORIDE 107 02/03/2022    CO2 26 03/12/2022    CO2 24 02/03/2022    BUN 21 03/12/2022    BUN 8 02/03/2022    CR 0.95 03/12/2022    CR 0.80 02/03/2022     (H) 03/12/2022     (H) 02/03/2022     COAGS: No results found for: PTT, INR, FIBR  POC: No results found for: BGM, HCG, HCGS  HEPATIC:   Lab Results   Component Value Date    ALBUMIN 2.8 (L) 02/03/2022    PROTTOTAL 4.9 (L) 02/03/2022    ALT 27 02/03/2022    AST 22 02/03/2022    ALKPHOS 43 02/03/2022    BILITOTAL 0.6 02/03/2022     OTHER:   Lab Results   Component Value Date    LACT 0.9 10/09/2021    ESTRELLA 8.7 03/12/2022    LIPASE 92 10/09/2021    TSH 1.70 02/03/2022       Anesthesia Plan    ASA Status:  2   NPO Status:  NPO Appropriate    Anesthesia Type: MAC.     - Reason for MAC: straight local not clinically adequate   Induction: N/a.   Maintenance: TIVA.        Consents    Anesthesia Plan(s) and associated risks, benefits, and realistic alternatives discussed. Questions answered and patient/representative(s) expressed understanding.    - Discussed:     - Discussed with:  Patient         Postoperative Care       PONV prophylaxis: Ondansetron (or other 5HT-3)     Comments:                Joe Peng MD

## 2022-04-07 NOTE — H&P
Bubba Branch  [id number]  male  61 year old      Reason for procedure/surgery: Positive FIT test    Patient Active Problem List   Diagnosis     Chemical dependency (H)     CARDIOVASCULAR SCREENING; LDL GOAL LESS THAN 130     Pes planus     Mild major depression (H)     Shoulder pain     Facial bones, closed fracture (H)       Past Surgical History:    Past Surgical History:   Procedure Laterality Date     ENT SURGERY       ESOPHAGOSCOPY, GASTROSCOPY, DUODENOSCOPY (EGD), COMBINED N/A 6/29/2016    Procedure: COMBINED ESOPHAGOSCOPY, GASTROSCOPY, DUODENOSCOPY (EGD), BIOPSY SINGLE OR MULTIPLE;  Surgeon: Kim Cisneros MD;  Location: [facility]       Past Medical History:   Past Medical History:   Diagnosis Date     Back injury 2008     Chemical dependency (H)      Depression      Head injury 2003, 2008       Social History:   Social History     Tobacco Use     Smoking status: Former Smoker     Years: 10.00     Smokeless tobacco: Never Used   Substance Use Topics     Alcohol use: Not Currently       Family History:   Family History   Problem Relation Age of Onset     Diabetes Mother      Heart Disease Mother      Hypertension Father        Allergies:   Allergies   Allergen Reactions     Penicillins Unknown     Pt unsure of reaction, possible childhood rxn       Active Medications:   Current Outpatient Medications   Medication Sig Dispense Refill     benzonatate (TESSALON) 100 MG capsule Take 1 capsule (100 mg) by mouth 3 times daily as needed for cough 30 capsule 0     escitalopram (LEXAPRO) 10 MG tablet Take 40 mg by mouth daily        predniSONE (DELTASONE) 20 MG tablet Take two tablets (= 40mg) each day for 5 (five) days 10 tablet 0     albuterol (PROAIR HFA) 108 (90 Base) MCG/ACT inhaler Inhale 2 puffs into the lungs every 6 hours for 10 days 6.7 g 0     bacitracin-polymyxin b (POLYSPORIN) 500-51761 UNIT/GM external ointment Apply topically 2 times daily 30 g 0     benzoyl peroxide 5 % external liquid Use daily as  "directed 226 g 11     Urea 40 % CREA Externally apply topically daily To the corn on the toe as needed for up to 6-12 weeks (Patient not taking: Reported on 3/7/2022) 120 g 4       Systemic Review:   CONSTITUTIONAL: NEGATIVE for fever, chills, change in weight  ENT/MOUTH: NEGATIVE for ear, mouth and throat problems  RESP: NEGATIVE for significant cough or SOB  CV: NEGATIVE for chest pain, palpitations or peripheral edema    Physical Examination:   Vital Signs: BP 99/66 (BP Location: Right arm)   Pulse 99   Temp 98.2  F (36.8  C) (Temporal)   Resp 18   Ht 1.778 m (5' 10\")   Wt 74.8 kg (165 lb)   SpO2 97%   BMI 23.68 kg/m    GENERAL: healthy, alert and no distress  NECK: no adenopathy, no asymmetry, masses, or scars  RESP: lungs clear to auscultation - no rales, rhonchi or wheezes  CV: regular rate and rhythm, normal S1 S2, no S3 or S4, no murmur, click or rub, no peripheral edema and peripheral pulses strong  ABDOMEN: soft, nontender, no hepatosplenomegaly, no masses and bowel sounds normal  MS: no gross musculoskeletal defects noted, no edema    Plan: Appropriate to proceed as scheduled.      Margarita Nick MD  4/7/2022    PCP:  [facility]    "

## 2022-04-08 LAB
PATH REPORT.COMMENTS IMP SPEC: NORMAL
PATH REPORT.COMMENTS IMP SPEC: NORMAL
PATH REPORT.FINAL DX SPEC: NORMAL
PATH REPORT.GROSS SPEC: NORMAL
PATH REPORT.MICROSCOPIC SPEC OTHER STN: NORMAL
PATH REPORT.RELEVANT HX SPEC: NORMAL
PHOTO IMAGE: NORMAL

## 2022-04-11 ENCOUNTER — ANCILLARY PROCEDURE (OUTPATIENT)
Dept: CT IMAGING | Facility: CLINIC | Age: 62
End: 2022-04-11
Attending: INTERNAL MEDICINE
Payer: COMMERCIAL

## 2022-04-11 DIAGNOSIS — R91.1 LUNG NODULE: ICD-10-CM

## 2022-04-11 PROCEDURE — 71250 CT THORAX DX C-: CPT | Mod: GC | Performed by: RADIOLOGY

## 2022-05-09 ENCOUNTER — OFFICE VISIT (OUTPATIENT)
Dept: FAMILY MEDICINE | Facility: CLINIC | Age: 62
End: 2022-05-09
Payer: COMMERCIAL

## 2022-05-09 VITALS
BODY MASS INDEX: 24.54 KG/M2 | OXYGEN SATURATION: 98 % | HEART RATE: 91 BPM | TEMPERATURE: 98.1 F | WEIGHT: 171 LBS | DIASTOLIC BLOOD PRESSURE: 67 MMHG | SYSTOLIC BLOOD PRESSURE: 110 MMHG

## 2022-05-09 DIAGNOSIS — L03.012 CELLULITIS OF FINGER, LEFT: Primary | ICD-10-CM
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
## 2022-05-09 PROCEDURE — 99213 OFFICE O/P EST LOW 20 MIN: CPT

## 2022-05-09 RX ORDER — SULFAMETHOXAZOLE/TRIMETHOPRIM 800-160 MG
1 TABLET ORAL 2 TIMES DAILY
Qty: 14 TABLET | Refills: 0 | Status: SHIPPED | OUTPATIENT
Start: 2022-05-09 | End: 2022-05-16

## 2022-05-09 ASSESSMENT — ENCOUNTER SYMPTOMS
NEUROLOGICAL NEGATIVE: 1
WOUND: 1
CONSTITUTIONAL NEGATIVE: 1
COLOR CHANGE: 1
MUSCULOSKELETAL NEGATIVE: 1

## 2022-05-09 NOTE — PATIENT INSTRUCTIONS
Perform hot soaks 2-4 times per day in warm water with epsom salt, Hibiclens (turquoise bottle), Iodine or any antibacterial.     Take antibiotic (Bactrim) with probiotics and continue for 10 days after (I recommend Culturelle, or ANY with at least a million units). Take antibiotic with food.

## 2022-05-09 NOTE — PROGRESS NOTES
Assessment & Plan     Cellulitis of finger, left  - sulfamethoxazole-trimethoprim (BACTRIM DS) 800-160 MG tablet  Dispense: 14 tablet; Refill: 0    Cat bite, initial encounter  - sulfamethoxazole-trimethoprim (BACTRIM DS) 800-160 MG tablet  Dispense: 14 tablet; Refill: 0     Bubba instructed to Perform hot soaks 2-4 times per day in warm water with epsom salt, Hibiclens (turquoise bottle), Iodine or any antibacterial.     Take antibiotic (Bactrim) with probiotics and continue for 10 days after (I recommend Culturelle, or ANY with at least a million units). Take antibiotic with food.    Return in about 1 week (around 5/16/2022), or if symptoms worsen or fail to improve.    Darrell Mac is a 61 year old male who presents to clinic today for the following health issues:  Chief Complaint   Patient presents with     Infection     L- index finger Swollen and tender and it hurts under the armpit      Maximino presents with reports of swollen and tender left index finger. He reports he noticed a small bump yesterday now his knuckle is red and swollen. He has several young kittens right now and assumes it was a cat bite. He denies fever or chills, numbness or tingling.           Review of Systems   Constitutional: Negative.    Musculoskeletal: Negative.    Skin: Positive for color change and wound.   Neurological: Negative.            Objective    /67 (BP Location: Right arm, Patient Position: Chair, Cuff Size: Adult Regular)   Pulse 91   Temp 98.1  F (36.7  C) (Oral)   Wt 77.6 kg (171 lb)   SpO2 98%   BMI 24.54 kg/m    Physical Exam  Constitutional:       Appearance: Normal appearance.   HENT:      Head: Normocephalic and atraumatic.   Eyes:      Extraocular Movements: Extraocular movements intact.      Conjunctiva/sclera: Conjunctivae normal.   Skin:     Comments: Left index finger with redness and central pustular lesion on DIP   Neurological:      General: No focal deficit present.      Mental Status:  He is alert and oriented to person, place, and time.   Psychiatric:         Mood and Affect: Mood normal.         Behavior: Behavior normal.         Thought Content: Thought content normal.         Judgment: Judgment normal.              Sergio Petersno PA-C

## 2022-06-05 ENCOUNTER — HEALTH MAINTENANCE LETTER (OUTPATIENT)
Age: 62
End: 2022-06-05

## 2022-10-06 ENCOUNTER — LAB REQUISITION (OUTPATIENT)
Dept: LAB | Facility: CLINIC | Age: 62
End: 2022-10-06
Payer: COMMERCIAL

## 2022-10-06 DIAGNOSIS — Z00.00 ENCOUNTER FOR GENERAL ADULT MEDICAL EXAMINATION WITHOUT ABNORMAL FINDINGS: ICD-10-CM

## 2022-10-06 PROCEDURE — 87389 HIV-1 AG W/HIV-1&-2 AB AG IA: CPT | Mod: ORL | Performed by: INTERNAL MEDICINE

## 2022-10-06 PROCEDURE — 87591 N.GONORRHOEAE DNA AMP PROB: CPT | Mod: ORL | Performed by: INTERNAL MEDICINE

## 2022-10-06 PROCEDURE — 87491 CHLMYD TRACH DNA AMP PROBE: CPT | Mod: ORL | Performed by: INTERNAL MEDICINE

## 2022-10-06 PROCEDURE — 86780 TREPONEMA PALLIDUM: CPT | Mod: ORL | Performed by: INTERNAL MEDICINE

## 2022-10-07 LAB
C TRACH DNA SPEC QL NAA+PROBE: NEGATIVE
HIV 1+2 AB+HIV1 P24 AG SERPL QL IA: NONREACTIVE
N GONORRHOEA DNA SPEC QL NAA+PROBE: NEGATIVE
T PALLIDUM AB SER QL: NONREACTIVE

## 2022-10-15 ENCOUNTER — HEALTH MAINTENANCE LETTER (OUTPATIENT)
Age: 62
End: 2022-10-15

## 2023-03-26 ENCOUNTER — HEALTH MAINTENANCE LETTER (OUTPATIENT)
Age: 63
End: 2023-03-26

## 2023-03-27 ENCOUNTER — HOSPITAL ENCOUNTER (EMERGENCY)
Facility: CLINIC | Age: 63
Discharge: HOME | End: 2023-03-27
Attending: EMERGENCY MEDICINE | Admitting: EMERGENCY MEDICINE
Payer: COMMERCIAL

## 2023-03-27 ENCOUNTER — APPOINTMENT (OUTPATIENT)
Dept: GENERAL RADIOLOGY | Facility: CLINIC | Age: 63
End: 2023-03-27
Attending: EMERGENCY MEDICINE
Payer: COMMERCIAL

## 2023-03-27 VITALS
RESPIRATION RATE: 20 BRPM | BODY MASS INDEX: 32.86 KG/M2 | HEIGHT: 70 IN | OXYGEN SATURATION: 96 % | HEART RATE: 100 BPM | DIASTOLIC BLOOD PRESSURE: 86 MMHG | SYSTOLIC BLOOD PRESSURE: 125 MMHG | WEIGHT: 229.5 LBS | TEMPERATURE: 98.1 F

## 2023-03-27 DIAGNOSIS — J45.21 EXACERBATION OF INTERMITTENT ASTHMA, UNSPECIFIED ASTHMA SEVERITY: ICD-10-CM

## 2023-03-27 LAB
ANION GAP SERPL CALCULATED.3IONS-SCNC: 11 MMOL/L (ref 7–15)
BASOPHILS # BLD AUTO: 0.1 10E3/UL (ref 0–0.2)
BASOPHILS NFR BLD AUTO: 1 %
BUN SERPL-MCNC: 12.8 MG/DL (ref 8–23)
CALCIUM SERPL-MCNC: 8.7 MG/DL (ref 8.8–10.2)
CHLORIDE SERPL-SCNC: 97 MMOL/L (ref 98–107)
CREAT SERPL-MCNC: 0.89 MG/DL (ref 0.67–1.17)
D DIMER PPP FEU-MCNC: 0.36 UG/ML FEU (ref 0–0.5)
DEPRECATED HCO3 PLAS-SCNC: 23 MMOL/L (ref 22–29)
EOSINOPHIL # BLD AUTO: 0.1 10E3/UL (ref 0–0.7)
EOSINOPHIL NFR BLD AUTO: 1 %
ERYTHROCYTE [DISTWIDTH] IN BLOOD BY AUTOMATED COUNT: 13.4 % (ref 10–15)
FLUAV RNA SPEC QL NAA+PROBE: NEGATIVE
FLUBV RNA RESP QL NAA+PROBE: NEGATIVE
GFR SERPL CREATININE-BSD FRML MDRD: >90 ML/MIN/1.73M2
GLUCOSE SERPL-MCNC: 105 MG/DL (ref 70–99)
HCT VFR BLD AUTO: 46.1 % (ref 40–53)
HGB BLD-MCNC: 15.6 G/DL (ref 13.3–17.7)
IMM GRANULOCYTES # BLD: 0 10E3/UL
IMM GRANULOCYTES NFR BLD: 0 %
LYMPHOCYTES # BLD AUTO: 0.8 10E3/UL (ref 0.8–5.3)
LYMPHOCYTES NFR BLD AUTO: 7 %
MCH RBC QN AUTO: 29.7 PG (ref 26.5–33)
MCHC RBC AUTO-ENTMCNC: 33.8 G/DL (ref 31.5–36.5)
MCV RBC AUTO: 88 FL (ref 78–100)
MONOCYTES # BLD AUTO: 0.3 10E3/UL (ref 0–1.3)
MONOCYTES NFR BLD AUTO: 3 %
NEUTROPHILS # BLD AUTO: 10.9 10E3/UL (ref 1.6–8.3)
NEUTROPHILS NFR BLD AUTO: 88 %
NRBC # BLD AUTO: 0 10E3/UL
NRBC BLD AUTO-RTO: 0 /100
NT-PROBNP SERPL-MCNC: 69 PG/ML (ref 0–900)
PLATELET # BLD AUTO: 254 10E3/UL (ref 150–450)
POTASSIUM SERPL-SCNC: 3.8 MMOL/L (ref 3.4–5.3)
RBC # BLD AUTO: 5.25 10E6/UL (ref 4.4–5.9)
RSV RNA SPEC NAA+PROBE: NEGATIVE
SARS-COV-2 RNA RESP QL NAA+PROBE: NEGATIVE
SODIUM SERPL-SCNC: 131 MMOL/L (ref 136–145)
TROPONIN T SERPL HS-MCNC: 8 NG/L
WBC # BLD AUTO: 12.2 10E3/UL (ref 4–11)

## 2023-03-27 PROCEDURE — 85004 AUTOMATED DIFF WBC COUNT: CPT | Performed by: EMERGENCY MEDICINE

## 2023-03-27 PROCEDURE — 99284 EMERGENCY DEPT VISIT MOD MDM: CPT | Mod: CS | Performed by: EMERGENCY MEDICINE

## 2023-03-27 PROCEDURE — 83880 ASSAY OF NATRIURETIC PEPTIDE: CPT | Performed by: EMERGENCY MEDICINE

## 2023-03-27 PROCEDURE — 94640 AIRWAY INHALATION TREATMENT: CPT | Performed by: EMERGENCY MEDICINE

## 2023-03-27 PROCEDURE — 84484 ASSAY OF TROPONIN QUANT: CPT | Performed by: EMERGENCY MEDICINE

## 2023-03-27 PROCEDURE — 93010 ELECTROCARDIOGRAM REPORT: CPT | Performed by: EMERGENCY MEDICINE

## 2023-03-27 PROCEDURE — 71045 X-RAY EXAM CHEST 1 VIEW: CPT

## 2023-03-27 PROCEDURE — 99285 EMERGENCY DEPT VISIT HI MDM: CPT | Mod: CS,25 | Performed by: EMERGENCY MEDICINE

## 2023-03-27 PROCEDURE — 93005 ELECTROCARDIOGRAM TRACING: CPT | Performed by: EMERGENCY MEDICINE

## 2023-03-27 PROCEDURE — 36415 COLL VENOUS BLD VENIPUNCTURE: CPT | Performed by: EMERGENCY MEDICINE

## 2023-03-27 PROCEDURE — 85379 FIBRIN DEGRADATION QUANT: CPT | Performed by: EMERGENCY MEDICINE

## 2023-03-27 PROCEDURE — C9803 HOPD COVID-19 SPEC COLLECT: HCPCS | Performed by: EMERGENCY MEDICINE

## 2023-03-27 PROCEDURE — 82310 ASSAY OF CALCIUM: CPT | Performed by: EMERGENCY MEDICINE

## 2023-03-27 PROCEDURE — 250N000012 HC RX MED GY IP 250 OP 636 PS 637: Performed by: EMERGENCY MEDICINE

## 2023-03-27 PROCEDURE — 250N000009 HC RX 250: Performed by: EMERGENCY MEDICINE

## 2023-03-27 PROCEDURE — 87637 SARSCOV2&INF A&B&RSV AMP PRB: CPT | Performed by: EMERGENCY MEDICINE

## 2023-03-27 RX ORDER — PREDNISONE 20 MG/1
40 TABLET ORAL ONCE
Status: COMPLETED | OUTPATIENT
Start: 2023-03-27 | End: 2023-03-27

## 2023-03-27 RX ORDER — IPRATROPIUM BROMIDE AND ALBUTEROL SULFATE 2.5; .5 MG/3ML; MG/3ML
3 SOLUTION RESPIRATORY (INHALATION) ONCE
Status: DISCONTINUED | OUTPATIENT
Start: 2023-03-27 | End: 2023-03-27 | Stop reason: ALTCHOICE

## 2023-03-27 RX ORDER — LEVALBUTEROL INHALATION SOLUTION 1.25 MG/3ML
1.25 SOLUTION RESPIRATORY (INHALATION) ONCE
Status: COMPLETED | OUTPATIENT
Start: 2023-03-27 | End: 2023-03-27

## 2023-03-27 RX ORDER — PREDNISONE 20 MG/1
TABLET ORAL
Qty: 10 TABLET | Refills: 0 | Status: SHIPPED | OUTPATIENT
Start: 2023-03-28 | End: 2024-05-07

## 2023-03-27 RX ORDER — ALBUTEROL SULFATE 0.83 MG/ML
2.5 SOLUTION RESPIRATORY (INHALATION) ONCE
Status: DISCONTINUED | OUTPATIENT
Start: 2023-03-27 | End: 2023-03-27

## 2023-03-27 RX ADMIN — LEVALBUTEROL HYDROCHLORIDE 1.25 MG: 1.25 SOLUTION RESPIRATORY (INHALATION) at 17:33

## 2023-03-27 RX ADMIN — LEVALBUTEROL HYDROCHLORIDE 1.25 MG: 1.25 SOLUTION RESPIRATORY (INHALATION) at 19:46

## 2023-03-27 RX ADMIN — IPRATROPIUM BROMIDE 0.5 MG: 0.5 SOLUTION RESPIRATORY (INHALATION) at 17:33

## 2023-03-27 RX ADMIN — PREDNISONE 40 MG: 20 TABLET ORAL at 16:51

## 2023-03-27 RX ADMIN — IPRATROPIUM BROMIDE 0.5 MG: 0.5 SOLUTION RESPIRATORY (INHALATION) at 19:46

## 2023-03-27 ASSESSMENT — ACTIVITIES OF DAILY LIVING (ADL)
ADLS_ACUITY_SCORE: 33
ADLS_ACUITY_SCORE: 35
ADLS_ACUITY_SCORE: 35

## 2023-03-27 NOTE — ED PROVIDER NOTES
ED Provider Note  [facility]      History     Chief Complaint   Patient presents with     Shortness of Breath     Patient presents with wheezing and shortness of breath. Patient reports history of asthma. Patient has attempted to use inhaler with no relief. Patient also reports having cough and congestion that started yesterday.      HPI  Bubba Branch is a 62 year old male with a history of asthma who presents to the emergency department with cough and wheezing.  The patient states that he developed some nasal congestion and postnasal drip yesterday.  Today, he has been coughing up some yellow sputum.  He also has had shortness of breath with wheezing.  He used his Ventolin inhaler without much improvement.  He denies any chest pain.  No fever.  No leg pain or swelling.  No abdominal pain.  No nausea or vomiting.  He has been on prednisone previously for asthma exacerbations.    Past Medical History  Past Medical History:   Diagnosis Date     Back injury 2008     Chemical dependency (H)      Depression      Head injury 2003, 2008     Past Surgical History:   Procedure Laterality Date     COLONOSCOPY N/A 4/7/2022    Procedure: COLONOSCOPY, WITH POLYPECTOMY, WITH CLIP;  Surgeon: Margarita Nick MD;  Location: [facility] OR     ENT SURGERY       ESOPHAGOSCOPY, GASTROSCOPY, DUODENOSCOPY (EGD), COMBINED N/A 6/29/2016    Procedure: COMBINED ESOPHAGOSCOPY, GASTROSCOPY, DUODENOSCOPY (EGD), BIOPSY SINGLE OR MULTIPLE;  Surgeon: Kim Cisneros MD;  Location:  GI     [START ON 3/28/2023] predniSONE (DELTASONE) 20 MG tablet  albuterol (PROAIR HFA) 108 (90 Base) MCG/ACT inhaler  bacitracin-polymyxin b (POLYSPORIN) 500-23940 UNIT/GM external ointment  benzonatate (TESSALON) 100 MG capsule  benzoyl peroxide 5 % external liquid  escitalopram (LEXAPRO) 10 MG tablet  Urea 40 % CREA      Allergies   Allergen Reactions     Penicillins Unknown     Pt unsure of reaction, possible childhood rxn  "    Family History  Family History   Problem Relation Age of Onset     Diabetes Mother      Heart Disease Mother      Hypertension Father      Social History   Social History     Tobacco Use     Smoking status: Former     Years: 10.00     Types: Cigarettes     Smokeless tobacco: Never   Substance Use Topics     Alcohol use: Not Currently     Drug use: No         A medically appropriate review of systems was performed with pertinent positives and negatives noted in the HPI, and all other systems negative.    Physical Exam   BP: 131/73  Pulse: 111  Temp: 98.1  F (36.7  C)  Resp: 20  Height: 177.8 cm (5' 10\")  Weight: 104.1 kg (229 lb 8 oz)  SpO2: 97 %  Physical Exam  Vitals and nursing note reviewed.   Constitutional:       General: He is not in acute distress.     Appearance: He is well-developed. He is not diaphoretic.   HENT:      Head: Atraumatic.   Eyes:      General: No scleral icterus.     Pupils: Pupils are equal, round, and reactive to light.   Cardiovascular:      Rate and Rhythm: Normal rate and regular rhythm.      Heart sounds: Normal heart sounds.   Pulmonary:      Effort: No respiratory distress.      Breath sounds: Wheezing (diffuse) present.   Abdominal:      General: Bowel sounds are normal.      Palpations: Abdomen is soft.      Tenderness: There is no abdominal tenderness.   Musculoskeletal:         General: No tenderness. Normal range of motion.   Skin:     General: Skin is warm.      Findings: No rash.   Neurological:      General: No focal deficit present.      Mental Status: He is alert.           ED Course, Procedures, & Data      Procedures               Results for orders placed or performed during the hospital encounter of 03/27/23   XR Chest Port 1 View     Status: None    Narrative    EXAM: XR CHEST PORT 1 VIEW  LOCATION: [facility]  DATE/TIME: 3/27/2023 8:52 PM    INDICATION: Shortness of breath  COMPARISON: 03/12/2022      Impression    " IMPRESSION: Mild elevation the right hemidiaphragm. No focal consolidation or effusion. Mild bronchial wall thickening. Heart size is normal. No acute osseous findings.   Symptomatic Influenza A/B, RSV, & SARS-CoV2 PCR (COVID-19) Nasopharyngeal     Status: Normal    Specimen: Nasopharyngeal; Swab   Result Value Ref Range    Influenza A PCR Negative Negative    Influenza B PCR Negative Negative    RSV PCR Negative Negative    SARS CoV2 PCR Negative Negative    Narrative    Testing was performed using the Xpert Xpress CoV2/Flu/RSV Assay on the [employer] GeneXpert Instrument. This test should be ordered for the detection of SARS-CoV-2, influenza, and RSV viruses in individuals who meet clinical and/or epidemiological criteria. Test performance is unknown in asymptomatic patients. This test is for in vitro diagnostic use under the FDA EUA for laboratories certified under CLIA to perform high or moderate complexity testing. This test has not been FDA cleared or approved. A negative result does not rule out the presence of PCR inhibitors in the specimen or target RNA in concentration below the limit of detection for the assay. If only one viral target is positive but coinfection with multiple targets is suspected, the sample should be re-tested with another FDA cleared, approved, or authorized test, if coinfection would change clinical management. This test was validated by the [facility]. These laboratories are certified under the Clinical Laboratory Improvement Amendments of 1988 (CLIA-88) as qualified to perform high complexity laboratory testing.   Basic metabolic panel     Status: Abnormal   Result Value Ref Range    Sodium 131 (L) 136 - 145 mmol/L    Potassium 3.8 3.4 - 5.3 mmol/L    Chloride 97 (L) 98 - 107 mmol/L    Carbon Dioxide (CO2) 23 22 - 29 mmol/L    Anion Gap 11 7 - 15 mmol/L    Urea Nitrogen 12.8 8.0 - 23.0 mg/dL    Creatinine 0.89 0.67 - 1.17 mg/dL    Calcium 8.7 (L) 8.8 - 10.2 mg/dL     Glucose 105 (H) 70 - 99 mg/dL    GFR Estimate >90 >60 mL/min/1.73m2   Troponin T, High Sensitivity     Status: Normal   Result Value Ref Range    Troponin T, High Sensitivity 8 <=22 ng/L   Nt probnp inpatient (BNP)     Status: Normal   Result Value Ref Range    N terminal Pro BNP Inpatient 69 0 - 900 pg/mL   D dimer quantitative     Status: Normal   Result Value Ref Range    D-Dimer Quantitative 0.36 0.00 - 0.50 ug/mL FEU    Narrative    This D-dimer assay is intended for use in conjunction with a clinical pretest probability assessment model to exclude pulmonary embolism (PE) and deep venous thrombosis (DVT) in outpatients suspected of PE or DVT. The cut-off value is 0.50 ug/mL FEU.   CBC with platelets and differential     Status: Abnormal   Result Value Ref Range    WBC Count 12.2 (H) 4.0 - 11.0 10e3/uL    RBC Count 5.25 4.40 - 5.90 10e6/uL    Hemoglobin 15.6 13.3 - 17.7 g/dL    Hematocrit 46.1 40.0 - 53.0 %    MCV 88 78 - 100 fL    MCH 29.7 26.5 - 33.0 pg    MCHC 33.8 31.5 - 36.5 g/dL    RDW 13.4 10.0 - 15.0 %    Platelet Count 254 150 - 450 10e3/uL    % Neutrophils 88 %    % Lymphocytes 7 %    % Monocytes 3 %    % Eosinophils 1 %    % Basophils 1 %    % Immature Granulocytes 0 %    NRBCs per 100 WBC 0 <1 /100    Absolute Neutrophils 10.9 (H) 1.6 - 8.3 10e3/uL    Absolute Lymphocytes 0.8 0.8 - 5.3 10e3/uL    Absolute Monocytes 0.3 0.0 - 1.3 10e3/uL    Absolute Eosinophils 0.1 0.0 - 0.7 10e3/uL    Absolute Basophils 0.1 0.0 - 0.2 10e3/uL    Absolute Immature Granulocytes 0.0 <=0.4 10e3/uL    Absolute NRBCs 0.0 10e3/uL   CBC with platelets differential     Status: Abnormal    Narrative    The following orders were created for panel order CBC with platelets differential.  Procedure                               Abnormality         Status                     ---------                               -----------         ------                     CBC with platelets and d...[[id number]]  Abnormal            Final result                  Please view results for these tests on the individual orders.      8:07 PM Recheck-feeling more short of air after second round of nebulizers.  His wheezing is decreased but still present.  He is tachycardic but maintaining normal oxygen saturations.  EKG, radiograph, labs ordered       EKG Interpretation:      Interpreted by KEYANA BHATT MD, MD  Time reviewed:2116   Symptoms at time of EKG: SOA   Rhythm:  Sinus tachycardia  Rate: 105  Axis: Normal  Ectopy: None  Conduction: Normal  ST Segments/ T Waves: No ST-T wave changes and No acute ischemic changes  Q Waves: None  Comparison to prior: Reveals a normal sinus rhythm, otherwise unchanged    Clinical Impression: sinus tachycardia       Results for orders placed or performed during the hospital encounter of 03/27/23   XR Chest Port 1 View     Status: None    Narrative    EXAM: XR CHEST PORT 1 VIEW  LOCATION: [facility]  DATE/TIME: 3/27/2023 8:52 PM    INDICATION: Shortness of breath  COMPARISON: 03/12/2022      Impression    IMPRESSION: Mild elevation the right hemidiaphragm. No focal consolidation or effusion. Mild bronchial wall thickening. Heart size is normal. No acute osseous findings.   Symptomatic Influenza A/B, RSV, & SARS-CoV2 PCR (COVID-19) Nasopharyngeal     Status: Normal    Specimen: Nasopharyngeal; Swab   Result Value Ref Range    Influenza A PCR Negative Negative    Influenza B PCR Negative Negative    RSV PCR Negative Negative    SARS CoV2 PCR Negative Negative    Narrative    Testing was performed using the Xpert Xpress CoV2/Flu/RSV Assay on the [employer] GeneXpert Instrument. This test should be ordered for the detection of SARS-CoV-2, influenza, and RSV viruses in individuals who meet clinical and/or epidemiological criteria. Test performance is unknown in asymptomatic patients. This test is for in vitro diagnostic use under the FDA EUA for laboratories certified under CLIA to perform high  or moderate complexity testing. This test has not been FDA cleared or approved. A negative result does not rule out the presence of PCR inhibitors in the specimen or target RNA in concentration below the limit of detection for the assay. If only one viral target is positive but coinfection with multiple targets is suspected, the sample should be re-tested with another FDA cleared, approved, or authorized test, if coinfection would change clinical management. This test was validated by the [facility]. These laboratories are certified under the Clinical Laboratory Improvement Amendments of 1988 (CLIA-88) as qualified to perform high complexity laboratory testing.   Basic metabolic panel     Status: Abnormal   Result Value Ref Range    Sodium 131 (L) 136 - 145 mmol/L    Potassium 3.8 3.4 - 5.3 mmol/L    Chloride 97 (L) 98 - 107 mmol/L    Carbon Dioxide (CO2) 23 22 - 29 mmol/L    Anion Gap 11 7 - 15 mmol/L    Urea Nitrogen 12.8 8.0 - 23.0 mg/dL    Creatinine 0.89 0.67 - 1.17 mg/dL    Calcium 8.7 (L) 8.8 - 10.2 mg/dL    Glucose 105 (H) 70 - 99 mg/dL    GFR Estimate >90 >60 mL/min/1.73m2   Troponin T, High Sensitivity     Status: Normal   Result Value Ref Range    Troponin T, High Sensitivity 8 <=22 ng/L   Nt probnp inpatient (BNP)     Status: Normal   Result Value Ref Range    N terminal Pro BNP Inpatient 69 0 - 900 pg/mL   D dimer quantitative     Status: Normal   Result Value Ref Range    D-Dimer Quantitative 0.36 0.00 - 0.50 ug/mL FEU    Narrative    This D-dimer assay is intended for use in conjunction with a clinical pretest probability assessment model to exclude pulmonary embolism (PE) and deep venous thrombosis (DVT) in outpatients suspected of PE or DVT. The cut-off value is 0.50 ug/mL FEU.   CBC with platelets and differential     Status: Abnormal   Result Value Ref Range    WBC Count 12.2 (H) 4.0 - 11.0 10e3/uL    RBC Count 5.25 4.40 - 5.90 10e6/uL    Hemoglobin 15.6 13.3 - 17.7 g/dL     Hematocrit 46.1 40.0 - 53.0 %    MCV 88 78 - 100 fL    MCH 29.7 26.5 - 33.0 pg    MCHC 33.8 31.5 - 36.5 g/dL    RDW 13.4 10.0 - 15.0 %    Platelet Count 254 150 - 450 10e3/uL    % Neutrophils 88 %    % Lymphocytes 7 %    % Monocytes 3 %    % Eosinophils 1 %    % Basophils 1 %    % Immature Granulocytes 0 %    NRBCs per 100 WBC 0 <1 /100    Absolute Neutrophils 10.9 (H) 1.6 - 8.3 10e3/uL    Absolute Lymphocytes 0.8 0.8 - 5.3 10e3/uL    Absolute Monocytes 0.3 0.0 - 1.3 10e3/uL    Absolute Eosinophils 0.1 0.0 - 0.7 10e3/uL    Absolute Basophils 0.1 0.0 - 0.2 10e3/uL    Absolute Immature Granulocytes 0.0 <=0.4 10e3/uL    Absolute NRBCs 0.0 10e3/uL   CBC with platelets differential     Status: Abnormal    Narrative    The following orders were created for panel order CBC with platelets differential.  Procedure                               Abnormality         Status                     ---------                               -----------         ------                     CBC with platelets and d...[[id number]]  Abnormal            Final result                 Please view results for these tests on the individual orders.     Medications   predniSONE (DELTASONE) tablet 40 mg (40 mg Oral $Given 3/27/23 1651)   ipratropium (ATROVENT) 0.02 % neb solution 0.5 mg (0.5 mg Nebulization $Given 3/27/23 1733)     And   levalbuterol (XOPENEX) neb solution 1.25 mg (1.25 mg Nebulization $Given 3/27/23 1733)   ipratropium (ATROVENT) 0.02 % neb solution 0.5 mg (0.5 mg Nebulization $Given 3/27/23 1946)     And   levalbuterol (XOPENEX) neb solution 1.25 mg (1.25 mg Nebulization $Given 3/27/23 1946)     Labs Ordered and Resulted from Time of ED Arrival to Time of ED Departure   BASIC METABOLIC PANEL - Abnormal       Result Value    Sodium 131 (*)     Potassium 3.8      Chloride 97 (*)     Carbon Dioxide (CO2) 23      Anion Gap 11      Urea Nitrogen 12.8      Creatinine 0.89      Calcium 8.7 (*)     Glucose 105 (*)     GFR Estimate >90      CBC WITH PLATELETS AND DIFFERENTIAL - Abnormal    WBC Count 12.2 (*)     RBC Count 5.25      Hemoglobin 15.6      Hematocrit 46.1      MCV 88      MCH 29.7      MCHC 33.8      RDW 13.4      Platelet Count 254      % Neutrophils 88      % Lymphocytes 7      % Monocytes 3      % Eosinophils 1      % Basophils 1      % Immature Granulocytes 0      NRBCs per 100 WBC 0      Absolute Neutrophils 10.9 (*)     Absolute Lymphocytes 0.8      Absolute Monocytes 0.3      Absolute Eosinophils 0.1      Absolute Basophils 0.1      Absolute Immature Granulocytes 0.0      Absolute NRBCs 0.0     INFLUENZA A/B, RSV, & SARS-COV2 PCR - Normal    Influenza A PCR Negative      Influenza B PCR Negative      RSV PCR Negative      SARS CoV2 PCR Negative     TROPONIN T, HIGH SENSITIVITY - Normal    Troponin T, High Sensitivity 8     NT PROBNP INPATIENT - Normal    N terminal Pro BNP Inpatient 69     D DIMER QUANTITATIVE - Normal    D-Dimer Quantitative 0.36       XR Chest Port 1 View   Final Result   IMPRESSION: Mild elevation the right hemidiaphragm. No focal consolidation or effusion. Mild bronchial wall thickening. Heart size is normal. No acute osseous findings.            10:09 PM Feeling better. Wheezing resolved.  No dyspnea or chest pain.    Critical care was not performed.     Medical Decision Making  The patient's presentation was of moderate complexity (a chronic illness mild to moderate exacerbation, progression, or side effect of treatment).    The patient's evaluation involved:  review of external note(s) from 1 sources (ED visit for cough and wheezing 3/2022)  ordering and/or review of 3+ test(s) in this encounter (see separate area of note for details)  review of 3+ test result(s) ordered prior to this encounter (Previous EKG, troponin, BNP, COVID testing)    The patient's management necessitated moderate risk (prescription drug management including medications given in the ED).      Assessment & Plan    62 year old male to  the emergency department with wheezing and shortness of air.  He has a history of asthma.  He has not found his Ventolin inhaler to be particularly effective at home.  His symptoms began yesterday with nasal congestion.  He has a cough that is productive of yellow sputum today.  Differential diagnosis includes asthma exacerbation, viral URI including COVID-19 and influenza, bacterial pneumonia.  Less likely is ACS, heart failure, pulmonary embolism, pneumothorax.  Initial treatment with prednisone and DuoNeb (ipratropium and Xopenex).  After nebulization, the patient felt worse but did have decreased wheezing.  COVID and influenza testing negative.  He did have some respiratory distress after his second nebulizer treatment so work-up was expanded to include EKG, chest radiograph, troponin, BNP, and other laboratory testing.  EKG and troponin normal so do not suspect ACS after several hours of symptoms.  BNP normal and no edema on chest radiograph do not suspect pulmonary edema/heart failure.  He does not have any evidence for pneumonia or pneumothorax on his chest radiograph.  D-dimer is negative so do not suspect pulmonary embolism.  The patient was allowed to rest in the emergency department.  At the time of reassessment, he had no ongoing symptoms and his wheezing has resolved.  He will be discharged home with a 5-day course of prednisone.  He will continue his Ventolin inhaler.  He does not appear to tolerate either the Xopenex or ipratropium very well by nebulizer today.  Patient will follow-up with his primary care clinic in the next 4 to 5 days.  Return precautions provided.    I have reviewed the nursing notes. I have reviewed the findings, diagnosis, plan and need for follow up with the patient.    New Prescriptions    PREDNISONE (DELTASONE) 20 MG TABLET    Take two tablets (= 40mg) each day for 5 (five) days       Final diagnoses:   Exacerbation of intermittent asthma, unspecified asthma severity     Chart  documentation was completed with Dragon voice-recognition software. Even though reviewed, this chart may still contain some grammatical, spelling, and word errors.     Caden Morrell Md  [facility] EMERGENCY DEPARTMENT  3/27/2023     Caden Morrell MD  03/28/23 1000

## 2023-03-27 NOTE — ED TRIAGE NOTES
Patient presents with wheezing and shortness of breath. Patient reports history of asthma. Patient has attempted to use inhaler with no relief. Patient also reports having cough and congestion that started yesterday.      Triage Assessment     Row Name 03/27/23 9631       Triage Assessment (Adult)    Airway WDL WDL       Respiratory WDL    Respiratory WDL X;expansion/retractions    Rhythm/Pattern, Respiratory shortness of breath       Skin Circulation/Temperature WDL    Skin Circulation/Temperature WDL WDL       Cardiac WDL    Cardiac WDL WDL       Peripheral/Neurovascular WDL    Peripheral Neurovascular WDL WDL       Cognitive/Neuro/Behavioral WDL    Cognitive/Neuro/Behavioral WDL WDL

## 2023-03-28 LAB
ATRIAL RATE - MUSE: 105 BPM
DIASTOLIC BLOOD PRESSURE - MUSE: NORMAL MMHG
INTERPRETATION ECG - MUSE: NORMAL
P AXIS - MUSE: 13 DEGREES
PR INTERVAL - MUSE: 140 MS
QRS DURATION - MUSE: 80 MS
QT - MUSE: 366 MS
QTC - MUSE: 483 MS
R AXIS - MUSE: 48 DEGREES
SYSTOLIC BLOOD PRESSURE - MUSE: NORMAL MMHG
T AXIS - MUSE: 26 DEGREES
VENTRICULAR RATE- MUSE: 105 BPM

## 2023-03-28 NOTE — DISCHARGE INSTRUCTIONS
Take complete course of prednisone.  Continue to use your Ventolin inhaler.    Follow-up with your primary care clinic in approximately 1 week.    Return to the emergency department if worsening symptoms, fever, or other concerns.

## 2023-04-02 ENCOUNTER — OFFICE VISIT (OUTPATIENT)
Dept: FAMILY MEDICINE | Facility: CLINIC | Age: 63
End: 2023-04-02
Payer: COMMERCIAL

## 2023-04-02 VITALS
TEMPERATURE: 97.6 F | OXYGEN SATURATION: 96 % | SYSTOLIC BLOOD PRESSURE: 129 MMHG | HEART RATE: 125 BPM | DIASTOLIC BLOOD PRESSURE: 81 MMHG

## 2023-04-02 DIAGNOSIS — J45.40 MODERATE PERSISTENT ASTHMA, UNSPECIFIED WHETHER COMPLICATED: Primary | ICD-10-CM

## 2023-04-02 PROCEDURE — 99213 OFFICE O/P EST LOW 20 MIN: CPT

## 2023-04-02 RX ORDER — SILDENAFIL 50 MG/1
50 TABLET, FILM COATED ORAL
COMMUNITY
Start: 2022-06-09

## 2023-04-02 RX ORDER — DEXTROAMPHETAMINE SACCHARATE, AMPHETAMINE ASPARTATE, DEXTROAMPHETAMINE SULFATE AND AMPHETAMINE SULFATE 5; 5; 5; 5 MG/1; MG/1; MG/1; MG/1
TABLET ORAL
COMMUNITY
Start: 2023-02-28

## 2023-04-02 RX ORDER — VALACYCLOVIR HYDROCHLORIDE 500 MG/1
1 TABLET, FILM COATED ORAL DAILY
COMMUNITY
Start: 2022-10-06

## 2023-04-02 RX ORDER — BUDESONIDE AND FORMOTEROL FUMARATE DIHYDRATE 160; 4.5 UG/1; UG/1
2 AEROSOL RESPIRATORY (INHALATION) 2 TIMES DAILY
COMMUNITY
Start: 2023-03-30

## 2023-04-02 NOTE — PROGRESS NOTES
URGENT CARE VISIT:    ASSESSMENT AND PLAN:      ICD-10-CM    1. Moderate persistent asthma, unspecified whether complicated  J45.40           Patient with new diagnosis of moderate persistent asthma just starting maintenance inhaler and will need to continue doses for the next few days to see full benefit.  We discussed continued use of this to aid with symptoms.  Patient is certainly not in respiratory distress no more wheezing, care decision making in regards to waiting to prescribe second round of prednisone.  We discussed the side effects of continued prednisone use.  I have encouraged patient to continue to use maintenance inhaler and albuterol inhaler as needed for short-term relief of symptoms.  Provider offered nebulizer treatment in clinic today but patient refuses and states that this was unhelpful in the ED and he did not like sensation from utilizing nebulizer treatment.        Red flag symptoms for urgent evaluation via ED shared.      Follow up with primary care provider with any problems, questions or concerns or if symptoms worsen or fail to improve. Patient verbalized understanding and is agreeable to plan. The patient was discharged ambulatory and in stable condition.    SUBJECTIVE:   Bubba Branch is a 62 year old male presenting for chief complaint of ongoing cough - non-productive with new diagnosis of moderate persistent asthma.  He was seen 6 days ago in the ED with thorough work-up and given prednisone burst 40 mg x 5 days.  He saw a PCP 2 days later with initiation of Symbicort 160-4.5 mcg 2 puffs twice daily and albuterol inhaler as needed.  He has used Symbicort inhaler for the past 2 days but notes ongoing cough.  He would like second round of prednisone medication today.  Denies shortness of breath, chest tightness, wheezing, fever/chills, inability to hydrate, or other worsening symptoms.      PMH:   Past Medical History:   Diagnosis Date     Back injury 2008     Chemical dependency  (H)      Depression      Head injury 2003, 2008     Allergies: Penicillins   Medications:   Current Outpatient Medications   Medication Sig Dispense Refill     albuterol (PROAIR HFA) 108 (90 Base) MCG/ACT inhaler Inhale 2 puffs into the lungs every 6 hours for 10 days 6.7 g 0     amphetamine-dextroamphetamine (ADDERALL) 20 MG tablet        benzoyl peroxide 5 % external liquid Use daily as directed 226 g 11     escitalopram (LEXAPRO) 10 MG tablet Take 40 mg by mouth daily        sildenafil (VIAGRA) 50 MG tablet Take 50 mg by mouth       SYMBICORT 160-4.5 MCG/ACT Inhaler Inhale 2 puffs into the lungs 2 times daily       valACYclovir (VALTREX) 500 MG tablet Take 1 tablet by mouth daily       benzonatate (TESSALON) 100 MG capsule Take 1 capsule (100 mg) by mouth 3 times daily as needed for cough (Patient not taking: Reported on 4/2/2023) 30 capsule 0     predniSONE (DELTASONE) 20 MG tablet Take two tablets (= 40mg) each day for 5 (five) days (Patient not taking: Reported on 4/2/2023) 10 tablet 0     Urea 40 % CREA Externally apply topically daily To the corn on the toe as needed for up to 6-12 weeks (Patient not taking: Reported on 3/7/2022) 120 g 4     Social History:   Social History     Tobacco Use     Smoking status: Former     Years: 10.00     Types: Cigarettes     Smokeless tobacco: Never   Vaping Use     Vaping status: Not on file   Substance Use Topics     Alcohol use: Not Currently       ROS:  Review of systems negative except as stated above.    OBJECTIVE:  /81   Pulse (!) 125   Temp 97.6  F (36.4  C) (Tympanic)   SpO2 96%     GENERAL APPEARANCE: healthy, alert and no distress  EYES: EOMI,  PERRL, conjunctiva clear  HENT: ear canals and TM's normal.  Nose and mouth without ulcers, erythema or lesions  NECK: supple, nontender, no lymphadenopathy  RESP: lungs clear to auscultation - no rales, rhonchi or wheezes  CV: regular rates and rhythm, normal S1 S2, no murmur noted  PSYCH: mentation appears normal  and affect normal/bright    Labs:      No results found for any visits on 04/02/23.

## 2023-04-05 ENCOUNTER — ANCILLARY PROCEDURE (OUTPATIENT)
Dept: CT IMAGING | Facility: CLINIC | Age: 63
End: 2023-04-05
Attending: INTERNAL MEDICINE
Payer: COMMERCIAL

## 2023-04-05 DIAGNOSIS — R91.1 PULMONARY NODULE: ICD-10-CM

## 2023-04-05 PROCEDURE — 71250 CT THORAX DX C-: CPT | Performed by: RADIOLOGY

## 2023-07-11 ENCOUNTER — ANCILLARY PROCEDURE (OUTPATIENT)
Dept: CT IMAGING | Facility: CLINIC | Age: 63
End: 2023-07-11
Attending: INTERNAL MEDICINE
Payer: COMMERCIAL

## 2023-07-11 DIAGNOSIS — R91.1 PULMONARY NODULE: ICD-10-CM

## 2023-07-11 PROCEDURE — 71250 CT THORAX DX C-: CPT | Performed by: RADIOLOGY

## 2023-07-20 ENCOUNTER — LAB REQUISITION (OUTPATIENT)
Dept: LAB | Facility: CLINIC | Age: 63
End: 2023-07-20
Payer: COMMERCIAL

## 2023-07-20 DIAGNOSIS — Z00.00 ENCOUNTER FOR GENERAL ADULT MEDICAL EXAMINATION WITHOUT ABNORMAL FINDINGS: ICD-10-CM

## 2023-07-20 PROCEDURE — 80061 LIPID PANEL: CPT | Mod: ORL | Performed by: INTERNAL MEDICINE

## 2023-07-21 LAB
CHOLEST SERPL-MCNC: 220 MG/DL
HDLC SERPL-MCNC: 47 MG/DL
LDLC SERPL CALC-MCNC: 135 MG/DL
NONHDLC SERPL-MCNC: 173 MG/DL
TRIGL SERPL-MCNC: 189 MG/DL

## 2023-08-20 ENCOUNTER — HEALTH MAINTENANCE LETTER (OUTPATIENT)
Age: 63
End: 2023-08-20

## 2024-01-07 ENCOUNTER — HEALTH MAINTENANCE LETTER (OUTPATIENT)
Age: 64
End: 2024-01-07

## 2024-02-09 ENCOUNTER — TRANSCRIBE ORDERS (OUTPATIENT)
Dept: OTHER | Age: 64
End: 2024-02-09

## 2024-02-09 DIAGNOSIS — H91.90 HEARING LOSS, UNSPECIFIED HEARING LOSS TYPE, UNSPECIFIED LATERALITY: Primary | ICD-10-CM

## 2024-04-29 NOTE — TELEPHONE ENCOUNTER
"FUTURE VISIT INFORMATION      FUTURE VISIT INFORMATION:  Date: 7/25/24  Time: 8 AM  Location: [facility] - ENT  REFERRAL INFORMATION:  Referring provider:  Katherine Boyd MD,  Referring providers clinic:  [facility],  Reason for visit/diagnosis:  Hearing loss, unspecified hearing loss type, unspecified laterality [H91.90], Referred by: Katherine Boyd MD, [facility], [address], [place] [address], Phone: [phone], Fax: [phone], per pt, recs in epic, [facility] verified     RECORDS REQUESTED FROM      Clinic name Comments Records Status Imaging Status   [facility], 2/8/24 note/ referral -Matthew Rehman MD   CE            \"Please notify/message CSS if patient completed outside imaging prior to scheduled appointment and/or any outside records that might have been missed at pre visit -Thank you\"  "

## 2024-05-07 ENCOUNTER — OFFICE VISIT (OUTPATIENT)
Dept: FAMILY MEDICINE | Facility: CLINIC | Age: 64
End: 2024-05-07
Payer: COMMERCIAL

## 2024-05-07 ENCOUNTER — ANCILLARY PROCEDURE (OUTPATIENT)
Dept: GENERAL RADIOLOGY | Facility: CLINIC | Age: 64
End: 2024-05-07
Attending: PHYSICIAN ASSISTANT
Payer: COMMERCIAL

## 2024-05-07 VITALS
OXYGEN SATURATION: 96 % | HEART RATE: 110 BPM | SYSTOLIC BLOOD PRESSURE: 145 MMHG | TEMPERATURE: 97.9 F | RESPIRATION RATE: 18 BRPM | DIASTOLIC BLOOD PRESSURE: 93 MMHG

## 2024-05-07 DIAGNOSIS — J45.41 MODERATE PERSISTENT ASTHMA WITH ACUTE EXACERBATION: Primary | ICD-10-CM

## 2024-05-07 DIAGNOSIS — R05.1 ACUTE COUGH: ICD-10-CM

## 2024-05-07 PROCEDURE — 99214 OFFICE O/P EST MOD 30 MIN: CPT

## 2024-05-07 PROCEDURE — 71046 X-RAY EXAM CHEST 2 VIEWS: CPT | Mod: TC | Performed by: RADIOLOGY

## 2024-05-07 RX ORDER — ALBUTEROL SULFATE 90 UG/1
2 AEROSOL, METERED RESPIRATORY (INHALATION) EVERY 6 HOURS PRN
Qty: 18 G | Refills: 0 | Status: SHIPPED | OUTPATIENT
Start: 2024-05-07

## 2024-05-07 RX ORDER — PREDNISONE 20 MG/1
40 TABLET ORAL DAILY
Qty: 10 TABLET | Refills: 0 | Status: SHIPPED | OUTPATIENT
Start: 2024-05-07 | End: 2024-05-12

## 2024-05-07 ASSESSMENT — ENCOUNTER SYMPTOMS
WHEEZING: 1
COUGH: 1
SHORTNESS OF BREATH: 1

## 2024-05-07 NOTE — PATIENT INSTRUCTIONS
Start the prednisone once daily for 5 days  Albuterol as needed for wheezing  Continue on Symbicort as indicated

## 2024-05-07 NOTE — PROGRESS NOTES
Assessment & Plan        1. Moderate persistent asthma with acute exacerbation    -Patient with a history of moderate persistent asthma, he is wheezing in the clinic.  Patient was offered a DuoNeb treatment.  Patient declined.  -Refilled patient albuterol inhaler to use as needed for wheezing  -Advised patient to continue on Symbicort as indicated  - predniSONE (DELTASONE) 20 MG tablet; Take 2 tablets (40 mg) by mouth daily for 5 days  Dispense: 10 tablet; Refill: 0  - albuterol (PROAIR HFA/PROVENTIL HFA/VENTOLIN HFA) 108 (90 Base) MCG/ACT inhaler; Inhale 2 puffs into the lungs every 6 hours as needed for shortness of breath, wheezing or cough  Dispense: 18 g; Refill: 0    2. Acute cough    - XR Chest 2 Views is negative for pneumonia per radiology report    Results for orders placed or performed in visit on 05/07/24   XR Chest 2 Views     Status: None (Preliminary result)    Narrative    CHEST TWO VIEWS   5/7/2024 3:41 PM     HISTORY: Acute cough.    COMPARISON: 3/27/2023.      Impression    IMPRESSION: No acute cardiopulmonary disease.       Patient Instructions   Start the prednisone once daily for 5 days  Albuterol as needed for wheezing  Continue on Symbicort as indicated    Return if symptoms worsen or fail to improve, for Follow up, 3- 5 days.    At the end of the encounter, I discussed results, diagnosis, medications. Discussed red flags for immediate return to clinic/ER, as well as indications for follow up if no improvement. Patient understood and agreed to plan. Patient was stable for discharge.    Darrell Mac is a 63 year old male who presents to clinic today for the following health issues:  Chief Complaint   Patient presents with    Urgent Care    Cough     10-11 days with a cough. Pt feels like it has traveled down and having some SOB. Has been using his inhaler.     HPI    Patient reports cough for 11 days.  He is here today because of shortness of breath.  Patient has a history of  moderate persistent asthma.  He uses Symbicort twice a day.  He is out of the albuterol inhaler.  He denies fever, chills.    Review of Systems   Respiratory:  Positive for cough, shortness of breath and wheezing.    All other systems reviewed and are negative.      Problem List:  2017-05: Shoulder pain  2012-04: Mild major depression (H)  2012-02: CARDIOVASCULAR SCREENING; LDL GOAL LESS THAN 130  2012-02: Pes planus  2012-02: Chemical dependency (H)  2012-02: Plantar fasciitis  2008-07: Facial bones, closed fracture (H)      Past Medical History:   Diagnosis Date    Back injury 2008    Chemical dependency (H)     Depression     Head injury 2003, 2008       Social History     Tobacco Use    Smoking status: Former     Types: Cigarettes     Passive exposure: Never    Smokeless tobacco: Never   Substance Use Topics    Alcohol use: Not Currently           Objective    BP (!) 145/93   Pulse 110   Temp 97.9  F (36.6  C) (Tympanic)   Resp 18   SpO2 96%   Physical Exam  Constitutional:       Appearance: Normal appearance.   HENT:      Head: Normocephalic.      Right Ear: Tympanic membrane normal.      Left Ear: Tympanic membrane normal.      Mouth/Throat:      Mouth: Mucous membranes are moist.      Pharynx: Oropharynx is clear. Uvula midline. No posterior oropharyngeal erythema.   Cardiovascular:      Rate and Rhythm: Normal rate and regular rhythm.   Pulmonary:      Effort: Pulmonary effort is normal.      Breath sounds: Wheezing (in all lung fields) present.   Lymphadenopathy:      Head:      Right side of head: No submental, submandibular or tonsillar adenopathy.      Left side of head: No submental, submandibular or tonsillar adenopathy.      Cervical: No cervical adenopathy.      Right cervical: No superficial cervical adenopathy.     Left cervical: No superficial cervical adenopathy.   Skin:     General: Skin is warm and dry.      Findings: No rash.   Neurological:      Mental Status: He is alert.   Psychiatric:          Mood and Affect: Mood normal.         Behavior: Behavior normal.              Areli Nunez PA-C

## 2024-05-26 ENCOUNTER — HEALTH MAINTENANCE LETTER (OUTPATIENT)
Age: 64
End: 2024-05-26

## 2024-07-03 ENCOUNTER — TELEPHONE (OUTPATIENT)
Dept: OTOLARYNGOLOGY | Facility: CLINIC | Age: 64
End: 2024-07-03
Payer: COMMERCIAL

## 2024-07-03 NOTE — TELEPHONE ENCOUNTER
Left Voicemail (1st Attempt) for the patient to call back and schedule the following:    Appointment type: Ent Audio  Provider: Any  Return date: Prior to 7/25 Mary greer  Specialty phone number: [phone]  Additional appointment(s) needed:   Additional Notes:

## 2024-07-05 NOTE — TELEPHONE ENCOUNTER
Left Voicemail (2nd Attempt) for the patient to call back and schedule the following:     Appointment type: Ent Audio  Provider: Any  Return date: Prior to 7/25 Mary greer  Specialty phone number: [phone]  Additional appointment(s) needed:   Additional Notes:

## 2024-07-19 ENCOUNTER — TELEPHONE (OUTPATIENT)
Dept: OTOLARYNGOLOGY | Facility: CLINIC | Age: 64
End: 2024-07-19
Payer: COMMERCIAL

## 2024-07-19 NOTE — TELEPHONE ENCOUNTER
Left Voicemail (1st Attempt) for the patient to call back and schedule the following:    Appointment type: New Ent   Provider: Mary Levine  Return date: next available     Specialty phone number: [phone]  Additional appointment(s) needed: ENT Audio prior  Additional Notes: r/s 7/25 Mary MCNEIL appt with ENT Audio prior

## 2024-07-25 ENCOUNTER — PRE VISIT (OUTPATIENT)
Dept: OTOLARYNGOLOGY | Facility: CLINIC | Age: 64
End: 2024-07-25

## 2024-09-26 NOTE — TELEPHONE ENCOUNTER
DIAGNOSIS: Bilateral Calluses and nail deformity / self / [facility] / [facility]    APPOINTMENT DATE: 10/2/24   NOTES STATUS DETAILS   OFFICE NOTE from referring provider N/A Self-referral    MEDICATION LIST Internal

## 2024-10-02 ENCOUNTER — HOSPITAL ENCOUNTER (EMERGENCY)
Facility: CLINIC | Age: 64
Discharge: HOME | End: 2024-10-02
Attending: EMERGENCY MEDICINE | Admitting: EMERGENCY MEDICINE
Payer: COMMERCIAL

## 2024-10-02 ENCOUNTER — PRE VISIT (OUTPATIENT)
Dept: ORTHOPEDICS | Facility: CLINIC | Age: 64
End: 2024-10-02

## 2024-10-02 VITALS
OXYGEN SATURATION: 99 % | TEMPERATURE: 98.8 F | BODY MASS INDEX: 31.57 KG/M2 | HEART RATE: 104 BPM | SYSTOLIC BLOOD PRESSURE: 143 MMHG | RESPIRATION RATE: 16 BRPM | WEIGHT: 220 LBS | DIASTOLIC BLOOD PRESSURE: 86 MMHG
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
## 2024-10-02 LAB
ALBUMIN SERPL BCG-MCNC: 4.1 G/DL (ref 3.5–5.2)
ALP SERPL-CCNC: 165 U/L (ref 40–150)
ALT SERPL W P-5'-P-CCNC: 28 U/L (ref 0–70)
ANION GAP SERPL CALCULATED.3IONS-SCNC: 11 MMOL/L (ref 7–15)
AST SERPL W P-5'-P-CCNC: 23 U/L (ref 0–45)
BASOPHILS # BLD AUTO: 0.1 10E3/UL (ref 0–0.2)
BASOPHILS NFR BLD AUTO: 1 %
BILIRUB SERPL-MCNC: 0.7 MG/DL
BUN SERPL-MCNC: 12.1 MG/DL (ref 8–23)
CALCIUM SERPL-MCNC: 9 MG/DL (ref 8.8–10.4)
CHLORIDE SERPL-SCNC: 99 MMOL/L (ref 98–107)
CREAT SERPL-MCNC: 0.82 MG/DL (ref 0.67–1.17)
CRP SERPL-MCNC: 10.16 MG/L
EGFRCR SERPLBLD CKD-EPI 2021: >90 ML/MIN/1.73M2
EOSINOPHIL # BLD AUTO: 0.2 10E3/UL (ref 0–0.7)
EOSINOPHIL NFR BLD AUTO: 2 %
ERYTHROCYTE [DISTWIDTH] IN BLOOD BY AUTOMATED COUNT: 13.1 % (ref 10–15)
GLUCOSE SERPL-MCNC: 93 MG/DL (ref 70–99)
HCO3 SERPL-SCNC: 24 MMOL/L (ref 22–29)
HCT VFR BLD AUTO: 45 % (ref 40–53)
HGB BLD-MCNC: 16 G/DL (ref 13.3–17.7)
IMM GRANULOCYTES # BLD: 0 10E3/UL
IMM GRANULOCYTES NFR BLD: 0 %
LYMPHOCYTES # BLD AUTO: 1.1 10E3/UL (ref 0.8–5.3)
LYMPHOCYTES NFR BLD AUTO: 9 %
MCH RBC QN AUTO: 30.9 PG (ref 26.5–33)
MCHC RBC AUTO-ENTMCNC: 35.6 G/DL (ref 31.5–36.5)
MCV RBC AUTO: 87 FL (ref 78–100)
MONOCYTES # BLD AUTO: 1 10E3/UL (ref 0–1.3)
MONOCYTES NFR BLD AUTO: 8 %
NEUTROPHILS # BLD AUTO: 10.2 10E3/UL (ref 1.6–8.3)
NEUTROPHILS NFR BLD AUTO: 81 %
NRBC # BLD AUTO: 0 10E3/UL
NRBC BLD AUTO-RTO: 0 /100
PLATELET # BLD AUTO: 297 10E3/UL (ref 150–450)
POTASSIUM SERPL-SCNC: 4 MMOL/L (ref 3.4–5.3)
PROT SERPL-MCNC: 6.7 G/DL (ref 6.4–8.3)
RBC # BLD AUTO: 5.18 10E6/UL (ref 4.4–5.9)
SODIUM SERPL-SCNC: 134 MMOL/L (ref 135–145)
WBC # BLD AUTO: 12.6 10E3/UL (ref 4–11)

## 2024-10-02 PROCEDURE — 86140 C-REACTIVE PROTEIN: CPT | Performed by: EMERGENCY MEDICINE

## 2024-10-02 PROCEDURE — 99283 EMERGENCY DEPT VISIT LOW MDM: CPT | Performed by: EMERGENCY MEDICINE

## 2024-10-02 PROCEDURE — 99284 EMERGENCY DEPT VISIT MOD MDM: CPT | Performed by: EMERGENCY MEDICINE

## 2024-10-02 PROCEDURE — 84520 ASSAY OF UREA NITROGEN: CPT | Performed by: EMERGENCY MEDICINE

## 2024-10-02 PROCEDURE — 85004 AUTOMATED DIFF WBC COUNT: CPT | Performed by: EMERGENCY MEDICINE

## 2024-10-02 PROCEDURE — 250N000013 HC RX MED GY IP 250 OP 250 PS 637: Performed by: EMERGENCY MEDICINE

## 2024-10-02 RX ORDER — DOXYCYCLINE 100 MG/1
100 CAPSULE ORAL 2 TIMES DAILY
Qty: 20 CAPSULE | Refills: 0 | Status: SHIPPED | OUTPATIENT
Start: 2024-10-02 | End: 2024-10-12

## 2024-10-02 RX ORDER — DOXYCYCLINE 100 MG/1
100 CAPSULE ORAL ONCE
Status: CANCELLED | OUTPATIENT
Start: 2024-10-02 | End: 2024-10-02

## 2024-10-02 RX ORDER — DOXYCYCLINE 100 MG/1
100 CAPSULE ORAL ONCE
Status: COMPLETED | OUTPATIENT
Start: 2024-10-02 | End: 2024-10-02

## 2024-10-02 RX ADMIN — DOXYCYCLINE HYCLATE 100 MG: 100 CAPSULE ORAL at 15:14

## 2024-10-02 ASSESSMENT — ACTIVITIES OF DAILY LIVING (ADL)
ADLS_ACUITY_SCORE: 35
ADLS_ACUITY_SCORE: 35

## 2024-10-02 ASSESSMENT — COLUMBIA-SUICIDE SEVERITY RATING SCALE - C-SSRS
6. HAVE YOU EVER DONE ANYTHING, STARTED TO DO ANYTHING, OR PREPARED TO DO ANYTHING TO END YOUR LIFE?: NO
1. IN THE PAST MONTH, HAVE YOU WISHED YOU WERE DEAD OR WISHED YOU COULD GO TO SLEEP AND NOT WAKE UP?: NO
2. HAVE YOU ACTUALLY HAD ANY THOUGHTS OF KILLING YOURSELF IN THE PAST MONTH?: NO

## 2024-10-02 NOTE — ED PROVIDER NOTES
"ED Provider Note  [facility]      History   No chief complaint on file.    HPI  Bubba Branch is a 63 year old male who presents to the emergency department with groin pain***    {History Review Selection (Optional):974926}  {ROS Selection (Optional):252439}    Physical Exam      Physical Exam  ***    ED Course, Procedures, & Data      Procedures       {ED Course Selections (Optional):762840}  {ED Sepsis CMS Documentation (Optional):561825::\" \"}       No results found for any visits on 10/02/24.  Medications - No data to display  Labs Ordered and Resulted from Time of ED Arrival to Time of ED Departure - No data to display  No orders to display          {Critical Care Performed?:653710}    Assessment & Plan    ***    I have reviewed the nursing notes. I have reviewed the findings, diagnosis, plan and need for follow up with the patient.    New Prescriptions    No medications on file       Final diagnoses:   None       ***  [facility] EMERGENCY DEPARTMENT  10/2/2024  "

## 2024-10-02 NOTE — ED PROVIDER NOTES
ED PROVIDER NOTE  [facility]  [facility] [place] [place]      History     Chief Complaint   Patient presents with    Insect Bite     Patient reports he was bitten by fleas ten days ago over a couple days.  He has lesions to his lower extremities.  On Saturday he started having symptoms including tiredness, headache, joint discomfort, and swollen lymph nodes in his right groin.  His cat had fleas.     HPI  Bubba Branch is a 63 year old male who presents to the emergency department following an insect bite.  Patient reports he was bitten by fleas 10 days ago over the course of a couple days.  He has lesions to his lower extremities.  On Saturday he started having symptoms including tiredness, headache, joint discomfort, swollen lymph nodes in his right groin.  He is cat was also noted to have fleas.***    I have reviewed the Medications, Allergies, Past Medical and Surgical History, and Social History in the [employer] [facility] system.  Past Medical History:   Diagnosis Date    Back injury 2008    Chemical dependency (H)     Depression     Head injury 2003, 2008       Past Surgical History:   Procedure Laterality Date    COLONOSCOPY N/A 4/7/2022    Procedure: COLONOSCOPY, WITH POLYPECTOMY, WITH CLIP;  Surgeon: Margarita Nick MD;  [place] [facility] [place]      ESOPHAGOSCOPY, GASTROSCOPY, DUODENOSCOPY (EGD), COMBINED N/A 6/29/2016    Procedure: COMBINED ESOPHAGOSCOPY, GASTROSCOPY, DUODENOSCOPY (EGD), BIOPSY SINGLE OR MULTIPLE;  Surgeon: Kim Cisneros MD;  Location:  GI        Review of your medicines        UNREVIEWED medicines. Ask your doctor about these medicines        Dose / Directions   * albuterol 108 (90 Base) MCG/ACT inhaler  Commonly known as: ProAir HFA      Dose: 2 puff  Inhale 2 puffs into the lungs every 6 hours for 10 days  Quantity: 6.7 g  Refills: 0     * albuterol 108 (90 Base) MCG/ACT inhaler  Commonly known as: PROAIR HFA/PROVENTIL HFA/VENTOLIN HFA  Used for: Moderate persistent  "asthma with acute exacerbation      Dose: 2 puff  Inhale 2 puffs into the lungs every 6 hours as needed for shortness of breath, wheezing or cough  Quantity: 18 g  Refills: 0     amphetamine-dextroamphetamine 20 MG tablet  Commonly known as: ADDERALL      Refills: 0     benzoyl peroxide 5 % external liquid  Used for: Acne vulgaris      Use daily as directed  Quantity: 226 g  Refills: 11     escitalopram 10 MG tablet  Commonly known as: LEXAPRO      Dose: 40 mg  Take 40 mg by mouth daily  Refills: 0     sildenafil 50 MG tablet  Commonly known as: VIAGRA      Dose: 50 mg  Take 50 mg by mouth  Refills: 0     Symbicort 160-4.5 MCG/ACT Inhaler  Generic drug: budesonide-formoterol      Dose: 2 puff  Inhale 2 puffs into the lungs 2 times daily  Refills: 0     Urea 40 % Crea  Used for: Corn of toe      Externally apply topically daily To the corn on the toe as needed for up to 6-12 weeks  Quantity: 120 g  Refills: 4     valACYclovir 500 MG tablet  Commonly known as: VALTREX      Dose: 1 tablet  Take 1 tablet by mouth daily  Refills: 0           * This list has 2 medication(s) that are the same as other medications prescribed for you. Read the directions carefully, and ask your doctor or other care provider to review them with you.                  Past medical history, past surgical history, medications, and allergies were reviewed with the patient. Additional pertinent items: {NONE:436856::\"None\"}    Family History   Problem Relation Age of Onset    Diabetes Mother     Heart Disease Mother     Hypertension Father        Social History     Tobacco Use    Smoking status: Former     Types: Cigarettes     Passive exposure: Never    Smokeless tobacco: Never   Substance Use Topics    Alcohol use: Not Currently     Social history was reviewed with the patient. Additional pertinent items: {NONE:598019::\"None\"}    Allergies   Allergen Reactions    Penicillins Unknown     Pt unsure of reaction, possible childhood rxn       Review of " "Systems  {Complete vs limited ROS:600029::\"A complete review of systems was performed with pertinent positives and negatives noted in the HPI, and all other systems negative.\"}    Physical Exam   BP: (!) 126/91  Pulse: 116  Temp: 99.8  F (37.7  C)  Resp: 16  Weight: 99.8 kg (220 lb)  SpO2: 97 %      Physical Exam    ED Course        Procedures         {ED COURSE SELECTIONS:642614}          No results found for this or any previous visit (from the past 24 hour(s)).           {ED Critical Care Performed?:162984}      Assessments & Plan (with Medical Decision Making)   ***    I have reviewed the nursing notes.    Medications - No data to display     I have reviewed the findings, diagnosis, plan and need for follow up with the patient.    TOTAL PATIENT CARE TIME INCLUDING DOCUMENTATION, FAMILY COMMUNICATION AND CARE COORDINATION WAS *** MINUTES.     New Prescriptions    No medications on file       Final diagnoses:   None       JANIE REBOLLEDO MD    10/2/2024   [facility] EMERGENCY DEPARTMENT    "

## 2024-10-02 NOTE — ED TRIAGE NOTES
Triage Assessment (Adult)       Row Name 10/02/24 1413          Triage Assessment    Airway WDL WDL        Respiratory WDL    Respiratory WDL WDL        Skin Circulation/Temperature WDL    Skin Circulation/Temperature WDL X  rash to lower extremities        Cardiac WDL    Cardiac WDL WDL        Peripheral/Neurovascular WDL    Peripheral Neurovascular WDL WDL        Cognitive/Neuro/Behavioral WDL    Cognitive/Neuro/Behavioral WDL WDL

## 2024-10-02 NOTE — DISCHARGE INSTRUCTIONS
Fill your prescription and take as directed    Please make an appointment to follow up with Your Primary Care Provider in 5-7 days if not improving.

## 2024-10-02 NOTE — ED PROVIDER NOTES
ED Provider Note  [facility]      History     Chief Complaint   Patient presents with    Insect Bite     Patient reports he was bitten by fleas ten days ago over a couple days.  He has lesions to his lower extremities.  On Saturday he started having symptoms including tiredness, headache, joint discomfort, and swollen lymph nodes in his right groin.  His cat had fleas.     HPI  Bubba Branch is a 63 year old male with no significant past medical history who presents for evaluation of lower extremity bug bites, a swollen right inguinal lymph node and migratory polyarthritis (shoulders, and hands).  The patient first developed pruritic lesions on his bilateral lower extremities about 10 days ago. He believes the lesions were flea bites brought in by adopted stray cats. About four days ago, he developed joint aches in his shoulders, significant fatigue, and a mild headache. He has not had any fevers, chills, or rashes. For the past two days he has had no appetite and this morning he felt nauseous and had some dry heaves.  He also noticed an enlarged right inguinal lymph node this morning which was what prompted him to seek evaluation in the ED.    A medically appropriate review of systems was performed with pertinent positives and negatives noted in the HPI, and all other systems negative.    Physical Exam   BP: (!) 126/91  Pulse: 116  Temp: 99.8  F (37.7  C)  Resp: 16  Weight: 99.8 kg (220 lb)  SpO2: 97 %  Physical Exam  Constitutional:       General: He is not in acute distress.  HENT:      Head: Normocephalic and atraumatic.   Cardiovascular:      Rate and Rhythm: Normal rate and regular rhythm.      Pulses: Normal pulses.      Heart sounds: Normal heart sounds. No murmur heard.     No gallop.   Pulmonary:      Effort: Pulmonary effort is normal.      Breath sounds: Normal breath sounds.   Abdominal:      Palpations: Abdomen is soft.      Tenderness: There is no abdominal tenderness.  There is no guarding.   Musculoskeletal:         General: No swelling, tenderness or deformity.      Cervical back: Normal range of motion and neck supple.   Lymphadenopathy:      Lower Body: Right inguinal adenopathy present.   Skin:     General: Skin is warm and dry.             Comments: Diffuse excoriated lesions on the bilateral distal lower extremities. Without erythema or warmth. Scab overlying upper abdomen/chest. Lump on dorsum of left hand.   Neurological:      General: No focal deficit present.      Mental Status: He is alert and oriented to person, place, and time.   Psychiatric:         Behavior: Behavior normal.         ED Course, Procedures, & Data     Orders Placed This Encounter   Procedures    CRP inflammation    Comprehensive metabolic panel    CBC with platelets and differential    CBC with platelets differential     Procedures          Results for orders placed or performed during the hospital encounter of 10/02/24   CRP inflammation     Status: Abnormal   Result Value Ref Range    CRP Inflammation 10.16 (H) <5.00 mg/L   Comprehensive metabolic panel     Status: Abnormal   Result Value Ref Range    Sodium 134 (L) 135 - 145 mmol/L    Potassium 4.0 3.4 - 5.3 mmol/L    Carbon Dioxide (CO2) 24 22 - 29 mmol/L    Anion Gap 11 7 - 15 mmol/L    Urea Nitrogen 12.1 8.0 - 23.0 mg/dL    Creatinine 0.82 0.67 - 1.17 mg/dL    GFR Estimate >90 >60 mL/min/1.73m2    Calcium 9.0 8.8 - 10.4 mg/dL    Chloride 99 98 - 107 mmol/L    Glucose 93 70 - 99 mg/dL    Alkaline Phosphatase 165 (H) 40 - 150 U/L    AST 23 0 - 45 U/L    ALT 28 0 - 70 U/L    Protein Total 6.7 6.4 - 8.3 g/dL    Albumin 4.1 3.5 - 5.2 g/dL    Bilirubin Total 0.7 <=1.2 mg/dL   CBC with platelets and differential     Status: Abnormal   Result Value Ref Range    WBC Count 12.6 (H) 4.0 - 11.0 10e3/uL    RBC Count 5.18 4.40 - 5.90 10e6/uL    Hemoglobin 16.0 13.3 - 17.7 g/dL    Hematocrit 45.0 40.0 - 53.0 %    MCV 87 78 - 100 fL    MCH 30.9 26.5 - 33.0 pg     MCHC 35.6 31.5 - 36.5 g/dL    RDW 13.1 10.0 - 15.0 %    Platelet Count 297 150 - 450 10e3/uL    % Neutrophils 81 %    % Lymphocytes 9 %    % Monocytes 8 %    % Eosinophils 2 %    % Basophils 1 %    % Immature Granulocytes 0 %    NRBCs per 100 WBC 0 <1 /100    Absolute Neutrophils 10.2 (H) 1.6 - 8.3 10e3/uL    Absolute Lymphocytes 1.1 0.8 - 5.3 10e3/uL    Absolute Monocytes 1.0 0.0 - 1.3 10e3/uL    Absolute Eosinophils 0.2 0.0 - 0.7 10e3/uL    Absolute Basophils 0.1 0.0 - 0.2 10e3/uL    Absolute Immature Granulocytes 0.0 <=0.4 10e3/uL    Absolute NRBCs 0.0 10e3/uL   CBC with platelets differential     Status: Abnormal    Narrative    The following orders were created for panel order CBC with platelets differential.  Procedure                               Abnormality         Status                     ---------                               -----------         ------                     CBC with platelets and d...[[id number]]  Abnormal            Final result                 Please view results for these tests on the individual orders.     Medications   doxycycline hyclate (VIBRAMYCIN) capsule 100 mg (100 mg Oral $Given 10/2/24 5423)         Assessment & Plan    Bubba Branch is a 63 year old male with no significant past medical history who presents for evaluation of lower extremity bug bites, a swollen right inguinal lymph node and migratory polyarthritis (shoulders, and hands), headache, nausea/dry heaving.     Migratory polyarthritis  Swollen R inguinal node  Diffuse bug bites bilateral lower extremities  Presentation is concerning for an insect born infection vs. autoimmune arthritis  - Workup to include CBC, CMP, CRP  - Doxycycline 100 mg BID x10 days    I have reviewed the nursing notes. I have reviewed the findings, diagnosis, plan and need for follow up with the patient.    Current Discharge Medication List        START taking these medications    Details   doxycycline hyclate (VIBRAMYCIN) 100 MG  capsule Take 1 capsule (100 mg) by mouth 2 times daily for 10 days.  Qty: 20 capsule, Refills: 0             Final diagnoses:   Insect bite, unspecified site, initial encounter - multiple with infection     Fill your prescription and take as directed    Please make an appointment to follow up with Your Primary Care Provider in 5-7 days if not improving.      Blas Whittington Md  [facility] EMERGENCY DEPARTMENT  10/2/2024     Blas Whittington MD  10/02/24 1546

## 2024-10-13 ENCOUNTER — HEALTH MAINTENANCE LETTER (OUTPATIENT)
Age: 64
End: 2024-10-13

## 2024-10-14 DIAGNOSIS — G47.33 OSA (OBSTRUCTIVE SLEEP APNEA): Primary | ICD-10-CM

## 2025-01-25 ENCOUNTER — HEALTH MAINTENANCE LETTER (OUTPATIENT)
Age: 65
End: 2025-01-25

## 2025-02-08 ENCOUNTER — OFFICE VISIT (OUTPATIENT)
Dept: FAMILY MEDICINE | Facility: CLINIC | Age: 65
End: 2025-02-08
Payer: COMMERCIAL

## 2025-02-08 VITALS
OXYGEN SATURATION: 100 % | SYSTOLIC BLOOD PRESSURE: 131 MMHG | RESPIRATION RATE: 20 BRPM | DIASTOLIC BLOOD PRESSURE: 83 MMHG | TEMPERATURE: 97.6 F | HEART RATE: 113 BPM

## 2025-02-08 DIAGNOSIS — J45.41 MODERATE PERSISTENT ASTHMA WITH ACUTE EXACERBATION: Primary | ICD-10-CM

## 2025-02-08 DIAGNOSIS — J20.9 ACUTE BRONCHITIS WITH COEXISTING CONDITION REQUIRING PROPHYLACTIC TREATMENT: ICD-10-CM

## 2025-02-08 DIAGNOSIS — J45.40 MODERATE PERSISTENT ASTHMA, UNSPECIFIED WHETHER COMPLICATED: ICD-10-CM

## 2025-02-08 PROCEDURE — 99214 OFFICE O/P EST MOD 30 MIN: CPT

## 2025-02-08 RX ORDER — ALBUTEROL SULFATE 90 UG/1
1-2 INHALANT RESPIRATORY (INHALATION) EVERY 4 HOURS PRN
Qty: 18 G | Refills: 0 | Status: SHIPPED | OUTPATIENT
Start: 2025-02-08

## 2025-02-08 RX ORDER — AZITHROMYCIN 250 MG/1
TABLET, FILM COATED ORAL
Qty: 6 TABLET | Refills: 0 | Status: SHIPPED | OUTPATIENT
Start: 2025-02-08 | End: 2025-02-13

## 2025-02-08 RX ORDER — BUDESONIDE AND FORMOTEROL FUMARATE DIHYDRATE 80; 4.5 UG/1; UG/1
2 AEROSOL RESPIRATORY (INHALATION) 2 TIMES DAILY
Qty: 6.9 G | Refills: 0 | Status: SHIPPED | OUTPATIENT
Start: 2025-02-08

## 2025-02-08 RX ORDER — PREDNISONE 20 MG/1
20 TABLET ORAL 2 TIMES DAILY
Qty: 10 TABLET | Refills: 0 | Status: SHIPPED | OUTPATIENT
Start: 2025-02-08 | End: 2025-02-13

## 2025-02-08 NOTE — PROGRESS NOTES
Assessment & Plan     Moderate persistent asthma with acute exacerbation    - predniSONE (DELTASONE) 20 MG tablet; Take 1 tablet (20 mg) by mouth 2 times daily for 5 days.  - albuterol (PROAIR HFA/PROVENTIL HFA/VENTOLIN HFA) 108 (90 Base) MCG/ACT inhaler; Inhale 1-2 puffs into the lungs every 4 hours as needed for cough, wheezing or shortness of breath.    Acute bronchitis with coexisting condition requiring prophylactic treatment    - azithromycin (ZITHROMAX) 250 MG tablet; Take 2 tablets (500 mg) by mouth daily for 1 day, THEN 1 tablet (250 mg) daily for 4 days.    Moderate persistent asthma, unspecified whether complicated    - budesonide-formoterol (SYMBICORT/BREYNA) 80-4.5 MCG/ACT Inhaler; Inhale 2 puffs into the lungs 2 times daily.    He declined a DuoNeb today.  States when he was in the ER a year ago he had a reaction to it and declined any treatment    Return in about 1 week (around 2/15/2025), or if symptoms worsen or fail to improve, for Follow up with your provider for your asthma recheck.    Darrell Mac is a 64 year old, presenting for the following health issues:  Urgent Care and Cough (1 wk with a cough. Pt has asthma and states he used his inhaler this morning.)    CASS Mac has a history of asthma and is out of his albuterol inhaler and is running low on his Symbicort as well.  Does see his provider yearly for asthma which she is due for soon.  Cough started 1 week ago.  No fever, chest pain or shortness of breath.  Has had some wheezing and uses albuterol once a day.  Denies any headache, body aches, ear pain or sore throat.  Cough is a dry and harsh and is made worse by going out in the cold weather            Review of Systems  Constitutional, HEENT, cardiovascular, pulmonary, gi and gu systems are negative, except as otherwise noted.      Objective    /83   Pulse 113   Temp 97.6  F (36.4  C) (Tympanic)   Resp 20   SpO2 100%   There is no height or weight on file to  calculate BMI.  Physical Exam   GENERAL: alert and no distress  EYES: Eyes grossly normal to inspection, PERRL and conjunctivae and sclerae normal  HENT: normal cephalic/atraumatic, ear canals and TM's normal, nose and mouth without ulcers or lesions, oral mucous membranes moist, and post nasal drainage noted  NECK: no adenopathy, no asymmetry, masses, or scars  RESP: no rales , rhonchi scattered, and expiratory wheezes throughout  CV: regular rates and rhythm, normal S1 S2, no S3 or S4, and no murmur, click or rub            Signed Electronically by:  [employer] [facility]

## 2025-06-14 ENCOUNTER — HEALTH MAINTENANCE LETTER (OUTPATIENT)
Age: 65
End: 2025-06-14

## 2025-07-24 ENCOUNTER — LAB REQUISITION (OUTPATIENT)
Dept: LAB | Facility: CLINIC | Age: 65
End: 2025-07-24
Payer: COMMERCIAL

## 2025-07-24 DIAGNOSIS — Z13.220 ENCOUNTER FOR SCREENING FOR LIPOID DISORDERS: ICD-10-CM

## 2025-07-24 LAB
CHOLEST SERPL-MCNC: 202 MG/DL
FASTING STATUS PATIENT QL REPORTED: NO
HDLC SERPL-MCNC: 54 MG/DL
LDLC SERPL CALC-MCNC: 124 MG/DL
NONHDLC SERPL-MCNC: 148 MG/DL
TRIGL SERPL-MCNC: 119 MG/DL

## 2025-07-24 PROCEDURE — 80061 LIPID PANEL: CPT | Mod: ORL | Performed by: INTERNAL MEDICINE

## (undated) DEVICE — ENDO FORCEP SPIKED SERRATED SHAFT JUMBO 239CM G56998

## (undated) DEVICE — ENDO SNARE EXACTO COLD 9MM LOOP 2.4MMX230CM 00711115

## (undated) DEVICE — TUBING SUCTION 12"X1/4" N612

## (undated) DEVICE — GOWN IMPERVIOUS 2XL BLUE

## (undated) DEVICE — SOL WATER IRRIG 500ML BOTTLE 2F7113

## (undated) DEVICE — KIT ENDO TURNOVER/PROCEDURE CARRY-ON 101822

## (undated) DEVICE — SPECIMEN CONTAINER 3OZ W/FORMALIN 59901

## (undated) DEVICE — CLIP HEMOCLIP ENDOSCOPIC INSTINCT 2.8X230CM INSC-7-230-SS

## (undated) DEVICE — SUCTION MANIFOLD NEPTUNE 2 SYS 1 PORT 702-025-000

## (undated) DEVICE — ENDO TRAP POLYP E-TRAP 00711099

## (undated) RX ORDER — LIDOCAINE 40 MG/G
CREAM TOPICAL
Status: DISPENSED
Start: 2021-03-17